# Patient Record
Sex: MALE | Race: WHITE
[De-identification: names, ages, dates, MRNs, and addresses within clinical notes are randomized per-mention and may not be internally consistent; named-entity substitution may affect disease eponyms.]

---

## 2018-12-18 NOTE — ED PHYSICIAN CHART
ED Chief Complaint/HPI





- Patient Information


Date Seen:: 12/18/18


Time Seen:: 15:20


Chief Complaint:: Weakness


History of Present Illness:: 





onset x 3 days of weakness, dizziness, syncope, S/P Fall, and vertigo; no 

report of H/As, visual or gait changes, neck pain, cough, C/P, SOB, Abd. Pain, A

/N/V/D/C, fever, chills, bleeding, or urinary s/s; pt's last tetanus shot: < 5 

years; UTD


Allergies:: 


 Allergies











Allergy/AdvReac Type Severity Reaction Status Date / Time


 


No Known Allergies Allergy   Verified 12/18/18 15:26











Historian:: Patient, Family Member


Review:: Nurse's Note Reviewed





<Liborio Parikh - Last Filed: 12/18/18 17:56>





- Patient Information


Allergies:: 


 Allergies











Allergy/AdvReac Type Severity Reaction Status Date / Time


 


No Known Allergies Allergy   Verified 12/18/18 15:26














<Elvis Rasmussen - Last Filed: 12/19/18 09:34>





ED Review of Systems





- Review of Systems


General/Constitutional: No fever, No chills, No weight loss, Weakness, No 

diaphoresis, No edema, No loss of appetite


Skin: No skin lesions, No rash, No bruising


Head: No headache, No light-headedness


Eyes: No loss of vision, No pain, No diplopia


ENT: No earache, No nasal drainage, No sore throat, No tinnitus


Neck: No neck pain, No swelling, No thyromegaly, No stiffness, No mass noted


Cardio Vascular: No chest pain, No palpitations, No PND, No orthopnea, No edema


Pulmonary: No SOB, No cough, No sputum, No wheezing


GI: No nausea, No vomiting, No diarrhea, No pain, No melena, No hematochezia, 

No constipation, No hematemesis


G/U: No dysuria, No frequency, No hematuria, No nacturia


Musculoskeletal: No bone or joint pain, No back pain, No muscle pain


Endocrine: No polyuria, No polydipsia


Psychiatric: No prior psych history, No depression, No anxiety, No suicidal 

ideation, No homicidal ideation, No auditory hallucination


Hematopoietic: No bruising, No lymphadenopathy


Allergic/Immuno: No urticaria, No angioedema


Neurological: No syncope, No focal symptoms, Weakness, No paresthesia, No 

headache, Seizure, Dizziness, Confusion, Vertigo





<Liborio Parikh - Last Filed: 12/18/18 17:56>





ED Past Medical History





- Past Medical History


Obtainable: Yes


Past Medical History: HTN, Seizures, Dementia


Family History: HTN


Social History: Non Smoker, No Alcohol, No Drug Use, Single, Care Facility


Surgical History: None


Psychiatricy History: Dementia


Medication: Reviewed





<Liborio Parikh - Last Filed: 12/18/18 17:56>





Family Medical History





- Family Member


  ** Mother


History Unknown: Yes





<Liborio Parikh - Last Filed: 12/18/18 17:56>





ED Physical Exam





- Physical Examination


General/Constitutional: Awake, Well-developed, well-nourished, Alert, No 

distress, GCS 15, Non-toxic appearing, Ambulatory


Head: Atraumatic


Eyes: Lids, conjuctiva normal, PERRL, EOMI


Skin: Nl inspection, No rash, No skin lesions, No ecchymosis, Well hydrated, No 

lymphadenopathy


ENMT: External ears, nose nl, TM canals nl, Nasal exam nl, Lips, teeth, gums nl

, Oropharynx nl, Tonsils nl


Neck: Nontender, Full ROM w/o pain, No JVD, No nuchal rigidity, No bruit, No 

mass, No stridor


Respiratory: Nl effort/Exclusion, Clear to Auscultation, No Wheeze/Rhonchi/Rales


Cardio Vascular: RRR, No murmur, gallop, rubs, NL S1 S2, Carotid/Femoral/Distal 

pulses equal bilaterally


GI: No tenderness/rebounding/guarding, No organomegaly, No hernia, Normal BS's, 

Nondistended, No mass/bruits, No McBurney tenderness, Rectum exam nl


: No CVA tenderness


Extremities: No tenderness or effusion, Full ROM, normal strength in all 

extremities, No edema, Normal digits & nails


Neuro/Psych: Alert/oriented, DTR's symmetric, Normal sensory exam, Normal motor 

strength, Judgement/insight normal, Mood normal, Normal gait, No focal deficits


Misc: Normal back, No paraspinal tenderness





<Liborio Parikh - Last Filed: 12/18/18 17:56>





ED Labs/Radiology/EKG Results





- Lab Results


Comments:: 





Reviewed





- EKG Interpretations


EKG Time:: 15:54


Rate & Rhythm: 53; SB


Comments:: 





old IWMI; non-specific st-t changes





<Liborio Parikh - Last Filed: 12/18/18 17:56>





- Lab Results


Results: 


 Laboratory Tests











  12/18/18 12/18/18 12/18/18





  15:40 15:40 15:40


 


WBC  5.2  


 


RBC  4.57  


 


Hgb  14.5  


 


Hct  43.1  


 


MCV  94.1  


 


MCH  31.8 H  


 


MCHC Differential  33.8  


 


RDW  12.9  


 


Plt Count  193  


 


MPV  10.4  


 


Add Manual Diff  YES  


 


Neutrophils %  43.1  


 


Band Neutrophils %  1  


 


Lymphocytes %  38.9  


 


Monocytes %  11.5 H  


 


Eosinophils %  5.5 H  


 


Basophils %  NP  


 


Neutrophils (Manual)  46  


 


Lymphocytes  38  


 


Monocytes  9  


 


Eosinophils  6 H  


 


PT   11.7 H 


 


INR   1.14 


 


Sodium    146 H


 


Potassium    3.8


 


Chloride    107


 


Carbon Dioxide    31.0


 


Anion Gap    11.8


 


BUN    22


 


Creatinine    1.0


 


Est GFR ( Amer)    > 60.0


 


Est GFR (Non-Af Amer)    > 60.0


 


BUN/Creatinine Ratio    22.0


 


Glucose    74


 


Calcium    9.9


 


Total Bilirubin    0.6


 


AST    19


 


ALT    40


 


Alkaline Phosphatase    55


 


Creatine Kinase    51


 


Troponin I   


 


B-Natriuretic Peptide   


 


Total Protein    7.3


 


Albumin    4.5


 


Globulin    2.8


 


Albumin/Globulin Ratio    1.6


 


Triglycerides    56


 


Cholesterol    171


 


LDL Cholesterol Direct    93


 


HDL Cholesterol    65


 


Amylase   


 


Lipase   


 


Urine Source   


 


Urine Color   


 


Urine Clarity   


 


Urine pH   


 


Ur Specific Gravity   


 


Urine Protein   


 


Urine Glucose (UA)   


 


Urine Ketones   


 


Urine Blood   


 


Urine Nitrate   


 


Urine Bilirubin   


 


Urine Urobilinogen   


 


Ur Leukocyte Esterase   


 


Urine RBC   


 


Urine WBC   


 


Ur Epithelial Cells   


 


Urine Bacteria   


 


Urine Opiates Screen   


 


Urine Methadone Screen   


 


Ur Barbiturates Screen   


 


Ur Tricyclics Screen   


 


Ur Phencyclidine Scrn   


 


Amphetamines Screen   


 


U Methamphetamines Scrn   


 


U Benzodiazepines Scrn   


 


U Cocaine Metab Screen   


 


U Cannabinoids Screen   














  12/18/18 12/18/18 12/18/18





  15:40 15:40 15:40


 


WBC   


 


RBC   


 


Hgb   


 


Hct   


 


MCV   


 


MCH   


 


MCHC Differential   


 


RDW   


 


Plt Count   


 


MPV   


 


Add Manual Diff   


 


Neutrophils %   


 


Band Neutrophils %   


 


Lymphocytes %   


 


Monocytes %   


 


Eosinophils %   


 


Basophils %   


 


Neutrophils (Manual)   


 


Lymphocytes   


 


Monocytes   


 


Eosinophils   


 


PT   


 


INR   


 


Sodium   


 


Potassium   


 


Chloride   


 


Carbon Dioxide   


 


Anion Gap   


 


BUN   


 


Creatinine   


 


Est GFR ( Amer)   


 


Est GFR (Non-Af Amer)   


 


BUN/Creatinine Ratio   


 


Glucose   


 


Calcium   


 


Total Bilirubin   


 


AST   


 


ALT   


 


Alkaline Phosphatase   


 


Creatine Kinase   


 


Troponin I   < 0.01 L 


 


B-Natriuretic Peptide  11.1  


 


Total Protein   


 


Albumin   


 


Globulin   


 


Albumin/Globulin Ratio   


 


Triglycerides   


 


Cholesterol   


 


LDL Cholesterol Direct   


 


HDL Cholesterol   


 


Amylase    41


 


Lipase    50


 


Urine Source   


 


Urine Color   


 


Urine Clarity   


 


Urine pH   


 


Ur Specific Gravity   


 


Urine Protein   


 


Urine Glucose (UA)   


 


Urine Ketones   


 


Urine Blood   


 


Urine Nitrate   


 


Urine Bilirubin   


 


Urine Urobilinogen   


 


Ur Leukocyte Esterase   


 


Urine RBC   


 


Urine WBC   


 


Ur Epithelial Cells   


 


Urine Bacteria   


 


Urine Opiates Screen   


 


Urine Methadone Screen   


 


Ur Barbiturates Screen   


 


Ur Tricyclics Screen   


 


Ur Phencyclidine Scrn   


 


Amphetamines Screen   


 


U Methamphetamines Scrn   


 


U Benzodiazepines Scrn   


 


U Cocaine Metab Screen   


 


U Cannabinoids Screen   














  12/18/18 12/18/18





  19:50 19:50


 


WBC  


 


RBC  


 


Hgb  


 


Hct  


 


MCV  


 


MCH  


 


MCHC Differential  


 


RDW  


 


Plt Count  


 


MPV  


 


Add Manual Diff  


 


Neutrophils %  


 


Band Neutrophils %  


 


Lymphocytes %  


 


Monocytes %  


 


Eosinophils %  


 


Basophils %  


 


Neutrophils (Manual)  


 


Lymphocytes  


 


Monocytes  


 


Eosinophils  


 


PT  


 


INR  


 


Sodium  


 


Potassium  


 


Chloride  


 


Carbon Dioxide  


 


Anion Gap  


 


BUN  


 


Creatinine  


 


Est GFR ( Amer)  


 


Est GFR (Non-Af Amer)  


 


BUN/Creatinine Ratio  


 


Glucose  


 


Calcium  


 


Total Bilirubin  


 


AST  


 


ALT  


 


Alkaline Phosphatase  


 


Creatine Kinase  


 


Troponin I  


 


B-Natriuretic Peptide  


 


Total Protein  


 


Albumin  


 


Globulin  


 


Albumin/Globulin Ratio  


 


Triglycerides  


 


Cholesterol  


 


LDL Cholesterol Direct  


 


HDL Cholesterol  


 


Amylase  


 


Lipase  


 


Urine Source  MIDSTREAM 


 


Urine Color  RED 


 


Urine Clarity  CLOUDY 


 


Urine pH  6.5 


 


Ur Specific Gravity  1.025 


 


Urine Protein  TRACE 


 


Urine Glucose (UA)  NEGATIVE 


 


Urine Ketones  NEGATIVE 


 


Urine Blood  LARGE H 


 


Urine Nitrate  NEGATIVE 


 


Urine Bilirubin  NEGATIVE 


 


Urine Urobilinogen  4.0 H 


 


Ur Leukocyte Esterase  TRACE H 


 


Urine RBC  2-5 H 


 


Urine WBC   H 


 


Ur Epithelial Cells  NONE SEEN 


 


Urine Bacteria  MODERATE H 


 


Urine Opiates Screen   NEGATIVE


 


Urine Methadone Screen   NEGATIVE


 


Ur Barbiturates Screen   NEGATIVE


 


Ur Tricyclics Screen   POSITIVE H


 


Ur Phencyclidine Scrn   NEGATIVE


 


Amphetamines Screen   NEGATIVE


 


U Methamphetamines Scrn   NEGATIVE


 


U Benzodiazepines Scrn   POSITIVE H


 


U Cocaine Metab Screen   NEGATIVE


 


U Cannabinoids Screen   NEGATIVE














<Elvis Rasmussen - Last Filed: 12/19/18 09:34>





ED Septic Shock





- .


Is Septic Shock (SBP<90, OR Lactate>4 mmol\L) present?: No





<Liborio Parikh - Last Filed: 12/18/18 17:56>





ED Reassessment (Disposition)





- Reassessment


Reassessment Condition:: Improved





- Diagnosis


Diagnosis:: 





Dx: Weakness; Dizziness; Vertigo; Syncope; S/P Fall; Bradycardia; Hypernatremia





<Liborio Parikh - Last Filed: 12/18/18 17:56>





- Reassessment


Reassessment:: 


Impression: 


- Urinary tract infection


- Psychosis





Plan:


- Rocephin 1g IM


- Admit to geropsych unit for further evaluation and management





Reassessment Condition:: Improved





- Patient Disposition


Discharge/Transfer:: [facility] w/in this hosp


Admitting Medical Physician:: Yisel Quintanilla


Admitting Psych Physician:: Blaze Kincaid





<Elvis Rasmussen - Last Filed: 12/19/18 09:34>

## 2018-12-19 NOTE — HISTORY & PHYSICAL
ADMIT DATE:     12/18/2018



CHIEF COMPLAINT:  Weakness.  The patient stated that he cannot keep himself

calm.



HISTORY OF PRESENT ILLNESS:  The patient is a 58-year-old male with a past

medical history of hypertension, seizures, dementia, brought in from nursing

facility for weakness, dizziness, and syncopal episode for the last 3 days.  The

patient also has reported vertigo.  No history of headache or visual change.  No

neck pain, no cough, or no shortness of breath.  No fever or no chills.  On

initial evaluation, the patient was afebrile and WBC count was 5200.  The

patient was admitted to Geropsych Unit for psychosis and UTI, Rocephin 1 gram IM

was given.  I was called and started on Bactrim.



PAST MEDICAL HISTORY:  Includes hypertension, seizures, and dementia.



FAMILY HISTORY:  Hypertension.



SOCIAL HISTORY:  Lives at a care facility.  The patient is single.  Denies

smoking, alcohol, or drug use.



PAST SURGICAL HISTORY:  None.



PSYCHIATRIC HISTORY:  Dementia.



MEDICATIONS:  As per medication reconciliation sheet.



ALLERGIES:  NKDA.



REVIEW OF SYSTEMS:

GENERAL:  The patient has no fever, no chills.

HEENT:  No diplopia, no photophobia, no sore throat.

RESPIRATORY:  No cough, no shortness of breath.

CARDIOVASCULAR:  No chest pain or palpitation.

GASTROINTESTINAL:  No nausea, no vomiting, no diarrhea, no constipation.

GENITOURINARY:  No dysuria.

NEUROLOGIC:  No headache, no dizziness, no focal weakness.

PSYCHIATRIC:  The patient is agitated at times.



PHYSICAL EXAMINATION:

VITAL SIGNS:  Shows temperature is 98.1 degrees Fahrenheit, pulse 63,

respirations 18, blood pressure 129/66.

GENERAL:  The patient is comfortable, sitting up in a chair.  Speech is clear. 

No aggressive behavior at this time.

HEENT:  Head is normocephalic, atraumatic.  Oral cavity moist.  Eyes, no pallor,

no icterus.  PERRLA, EOMI.

NECK:  Supple, no JVD, no carotid bruit.  Trachea in midline.

CHEST:  Bilateral breath sounds.  No crackles or wheezing.

HEART:  S1, S2 within normal limits.  Regular rhythm.  No murmur, no gallop.

ABDOMEN:  Soft, nontender, nondistended.  Bowel sounds present.

EXTREMITIES:  No cyanosis, no clubbing, no edema.

NEUROLOGICAL:  Alert, awake, oriented x 3.



LABORATORY DATA:  Current lab shows WBC count is 5200, hemoglobin 14.5,

hematocrit 43.1, platelets are 193,000, neutrophil is 43%.  Sodium is 146,

potassium 3.8, chloride 100, bicarbonate is 31, BUN is 22, creatinine 1, and

glucose is 74.  Urinalysis showed large blood, wbc's , trace

leukoesterase, and rbc's 2-5.  Toxicology screen was positive for tricyclics and

benzodiazepines.



IMPRESSION:

1.  Urinary tract infection.

2.  Hypertension, controlled.

3.  History of seizure disorder.

4.   Dementia.

5.  Confusion.

6.  Agitation.





PLAN:  We will continue same treatment.  Started Bactrim-DS for UTI.  Check CPK

and renal ultrasound.





DD: 12/19/2018 17:07

DT: 12/19/2018 19:39

JOB# [id number]  [id number]

[email or web address]

## 2018-12-20 NOTE — PSYCHIATRIC EVALUATION
DATE OF SERVICE:  12/18/2018



IDENTIFYING DATA:  The patient is a 58-year-old  male living with his

family.  Information obtained directly interviewing the patient as well as

reviewing the admission papers.  The patient has been admitted from [facility] in [place] accompanied by son.  The patient is reported to have been

not able to care for self and the patient has been admitted by the son.



HISTORY OF PRESENT ILLNESS:  This is the first psychiatric hospitalization to

[facility] for this patient who is selectively mute and is

mumbling to most of self.  As per the report the patient is reported to have

fallen down and hit his head on the day of prior to the hospitalization.  The

patient has a history of seizures and falls.  The patient is reported to have

lost 35 pounds of weight in the last 1 month and has been getting forgetful and

not able to care for self and the patient is in need of placement.  The patient

has been brought over here for stabilization.  The patient has not been able to

provide much of information.  Review of the chart indicated that the patient has

been on a higher dose of the Seroquel and the patient is too drowsy, it is

decided to decrease the dose on the medications.



PAST PSYCHIATRIC HISTORY:  Details are not known.



MEDICAL HISTORY:  Physical examination is requested to be done by Dr. Quintanilla.



PHYSICAL OR SEXUAL ABUSE HISTORY:  None.



LEGAL PROBLEMS:  None at this time.



STRENGTH AND ASSETS:  The patient has good family support.



MENTAL STATUS EXAMINATION:  The patient is a 58-year-old, looking his stated

age, thin built, superficially cooperative, selectively mute, not providing much

of information.  The patient, however, is noted to be mumbling.  Eye contact is

noted to be extremely poor.  Affect is noted to be very flat.  The patient is to

be depressed.  The patient is not able to care for self.  The patient is a total

care.  The patient's short and long-term memory could not be tested because the

patient is not able to answer any of the questions I am closing.  The patient is

alert and awake, but is not aware that he is in the hospital.



DIAGNOSTIC IMPRESSION:

AXIS I:  Dementia, Alzheimer's type and behavioral change, secondary trait.

AXIS II:  None.

AXIS III:  As per Dr. Quintanilla.



IMMEDIATE TREATMENT PLAN:  The patient is going to be observed on inpatient

unit, provided with supportive psychotherapy.  The patient is going to be

closely monitored and encouraged to verbalize the concerns.  Once stabilized,

the patient is going to be discharged to the skilled nursing facility for

further care.





DD: 12/19/2018 17:54

DT: 12/20/2018 02:53

[facility]# [id number]  [id number]

## 2018-12-20 NOTE — INFECTIOUS DISEASE PROG NOTE
Infectious Disease Subjective





- Review of Systems


Service Date: 12/20/18


Subjective: 





There is no new change, there is no fever.





Infectious Disease Objective





- Results


Result Diagrams: 


 12/18/18 15:40





 12/18/18 15:40


Recent Labs: 


 Laboratory Last Values











WBC  5.2 Th/cmm (4.8-10.8)   12/18/18  15:40    


 


RBC  4.57 Mil/cmm (4.30-5.70)   12/18/18  15:40    


 


Hgb  14.5 gm/dL (12-16)   12/18/18  15:40    


 


Hct  43.1 % (41.0-60)   12/18/18  15:40    


 


MCV  94.1 fl (80-99)   12/18/18  15:40    


 


MCH  31.8 pg (26.0-30.0)  H  12/18/18  15:40    


 


MCHC Differential  33.8 pg (28.0-36.0)   12/18/18  15:40    


 


RDW  12.9 % (11.5-20.0)   12/18/18  15:40    


 


Plt Count  193 Th/cmm (150-400)   12/18/18  15:40    


 


MPV  10.4 fl  12/18/18  15:40    


 


Add Manual Diff  YES   12/18/18  15:40    


 


Neutrophils %  43.1 % (40.0-80.0)   12/18/18  15:40    


 


Band Neutrophils %  1 % (0-10)   12/18/18  15:40    


 


Lymphocytes %  38.9 % (20.0-50.0)   12/18/18  15:40    


 


Monocytes %  11.5 % (2.0-10.0)  H  12/18/18  15:40    


 


Eosinophils %  5.5 % (0.0-5.0)  H  12/18/18  15:40    


 


Basophils %  NP   12/18/18  15:40    


 


Neutrophils (Manual)  46 % (40-80)   12/18/18  15:40    


 


Lymphocytes  38 % (20-50)   12/18/18  15:40    


 


Monocytes  9 % (2-10)   12/18/18  15:40    


 


Eosinophils  6 % (0-5)  H  12/18/18  15:40    


 


PT  11.7 SECONDS (9.5-11.5)  H  12/18/18  15:40    


 


INR  1.14  (0.5-1.4)   12/18/18  15:40    


 


Sodium  146 mEq/L (136-145)  H  12/18/18  15:40    


 


Potassium  3.8 mEq/L (3.5-5.1)   12/18/18  15:40    


 


Chloride  107 mEq/L ()   12/18/18  15:40    


 


Carbon Dioxide  31.0 mEq/L (21.0-31.0)   12/18/18  15:40    


 


Anion Gap  11.8  (7.0-16.0)   12/18/18  15:40    


 


BUN  22 mg/dL (7-25)   12/18/18  15:40    


 


Creatinine  1.0 mg/dL (0.7-1.3)   12/18/18  15:40    


 


Est GFR ( Amer)  > 60.0 ml/min (>90)   12/18/18  15:40    


 


Est GFR (Non-Af Amer)  > 60.0 ml/min  12/18/18  15:40    


 


BUN/Creatinine Ratio  22.0   12/18/18  15:40    


 


Glucose  74 mg/dL ()   12/18/18  15:40    


 


POC Glucose  99 MG/DL (70 - 105)   12/18/18  22:17    


 


Calcium  9.9 mg/dL (8.6-10.3)   12/18/18  15:40    


 


Total Bilirubin  0.6 mg/dL (0.3-1.0)   12/18/18  15:40    


 


AST  19 U/L (13-39)   12/18/18  15:40    


 


ALT  40 U/L (7-52)   12/18/18  15:40    


 


Alkaline Phosphatase  55 U/L ()   12/18/18  15:40    


 


Creatine Kinase  130 U/L ()   12/20/18  06:30    


 


Troponin I  < 0.01 ng/mL (0.01-0.05)  L  12/18/18  15:40    


 


B-Natriuretic Peptide  11.1 pg/mL (5.0-100.0)   12/18/18  15:40    


 


Total Protein  7.3 gm/dL (6.0-8.3)   12/18/18  15:40    


 


Albumin  4.5 gm/dL (4.2-5.5)   12/18/18  15:40    


 


Globulin  2.8 gm/dL  12/18/18  15:40    


 


Albumin/Globulin Ratio  1.6  (1.0-1.8)   12/18/18  15:40    


 


Triglycerides  56 mg/dL (<150)   12/18/18  15:40    


 


Cholesterol  171 mg/dL (<200)   12/18/18  15:40    


 


LDL Cholesterol Direct  93 mg/dL ()   12/18/18  15:40    


 


HDL Cholesterol  65 mg/dL (23-92)   12/18/18  15:40    


 


Amylase  41 U/L ()   12/18/18  15:40    


 


Lipase  50 U/L (11-82)   12/18/18  15:40    


 


TSH  1.52 uIU/ml (0.34-5.60)   12/20/18  06:30    


 


Urine Source  MIDSTREAM   12/18/18  19:50    


 


Urine Color  RED   12/18/18  19:50    


 


Urine Clarity  CLOUDY  (CLEAR)   12/18/18  19:50    


 


Urine pH  6.5  (4.6 - 8.0)   12/18/18  19:50    


 


Ur Specific Gravity  1.025  (1.005-1.030)   12/18/18  19:50    


 


Urine Protein  TRACE mg/dL (NEGATIVE)   12/18/18  19:50    


 


Urine Glucose (UA)  NEGATIVE mg/dL (NEGATIVE)   12/18/18  19:50    


 


Urine Ketones  NEGATIVE mg/dL (NEGATIVE)   12/18/18  19:50    


 


Urine Blood  LARGE  (NEGATIVE)  H  12/18/18  19:50    


 


Urine Nitrate  NEGATIVE  (NEGATIVE)   12/18/18  19:50    


 


Urine Bilirubin  NEGATIVE  (NEGATIVE)   12/18/18  19:50    


 


Urine Urobilinogen  4.0 E.U./dL (0.2 - 1.0)  H  12/18/18  19:50    


 


Ur Leukocyte Esterase  TRACE  (NEGATIVE)  H  12/18/18  19:50    


 


Urine RBC  2-5 /hpf (0-5)  H  12/18/18  19:50    


 


Urine WBC   /hpf (0-5)  H  12/18/18  19:50    


 


Ur Epithelial Cells  NONE SEEN /lpf (FEW)   12/18/18  19:50    


 


Urine Bacteria  MODERATE /hpf (NONE SEEN)  H  12/18/18  19:50    


 


Urine Opiates Screen  NEGATIVE  (NEGATIVE)   12/18/18  19:50    


 


Urine Methadone Screen  NEGATIVE  (NEGATIVE)   12/18/18  19:50    


 


Ur Barbiturates Screen  NEGATIVE  (NEGATIVE)   12/18/18  19:50    


 


Ur Tricyclics Screen  POSITIVE  (NEGATIVE)  H  12/18/18  19:50    


 


Ur Phencyclidine Scrn  NEGATIVE  (NEGATIVE)   12/18/18  19:50    


 


Amphetamines Screen  NEGATIVE  (NEGATIVE)   12/18/18  19:50    


 


U Methamphetamines Scrn  NEGATIVE  (NEGATIVE)   12/18/18  19:50    


 


U Benzodiazepines Scrn  POSITIVE  (NEGATIVE)  H  12/18/18  19:50    


 


U Cocaine Metab Screen  NEGATIVE  (NEGATIVE)   12/18/18  19:50    


 


U Cannabinoids Screen  NEGATIVE  (NEGATIVE)   12/18/18  19:50    














- Physical Exam


Vitals and I&O: 


 Vital Signs











Temp  97.9 F   12/20/18 07:07


 


Pulse  61   12/20/18 07:07


 


Resp  18   12/20/18 07:07


 


BP  112/61   12/20/18 07:07


 


Pulse Ox  98   12/20/18 07:07








 Intake & Output











 12/19/18 12/20/18 12/20/18





 18:59 06:59 18:59


 


Intake Total  100 120


 


Balance  100 120


 


Intake:   


 


  Oral  100 120


 


Other:   


 


  # Voids 3 1 3


 


  # Bowel Movements 0  











Active Medications: 


Current Medications





Acetaminophen (Tylenol)  650 mg PO Q4H PRN


   PRN Reason: Pain or Fever >101


   Stop: 02/16/19 23:19


Al Hydrox/Mg Hydrox/Simethicone (Maalox)  30 ml PO Q4HR PRN


   PRN Reason: GI DISTRESS


   Stop: 02/16/19 22:34


Citalopram Hydrobromide (Celexa)  40 mg PO DAILY [facility]; Protocol


   Stop: 02/17/19 08:59


   Last Admin: 12/19/18 08:59 Dose:  40 mg


Donepezil HCl (Aricept)  10 mg PO HS [facility]


   Stop: 02/17/19 20:59


   Last Admin: 12/19/18 21:14 Dose:  10 mg


Guaifenesin/Dextromethorphan (Robitussin Dm)  10 ml PO Q6H PRN


   PRN Reason: Cough


   Stop: 02/16/19 23:19


Levetiracetam (Keppra)  1,000 mg PO BID [facility]


   Stop: 02/17/19 08:59


   Last Admin: 12/20/18 10:25 Dose:  1,000 mg


Lorazepam (Ativan)  0.5 mg PO Q4HR PRN; Protocol


   PRN Reason: Anxiety


   Stop: 01/17/19 22:34


   Last Admin: 12/20/18 13:48 Dose:  0.5 mg


Lorazepam (Ativan)  2 mg PO HS [facility]; Protocol


   Stop: 02/17/19 20:59


   Last Admin: 12/19/18 21:14 Dose:  2 mg


Magnesium Hydroxide (Milk Of Magnesia)  30 ml PO HS PRN


   PRN Reason: Constipation


Memantine (Namenda)  10 mg PO BID [facility]


   Stop: 02/17/19 08:59


   Last Admin: 12/20/18 13:47 Dose:  Not Given


Multivitamins/Vitamin C (Theragran)  1 tab PO DAILY [facility]


   Stop: 02/17/19 08:59


   Last Admin: 12/20/18 13:47 Dose:  Not Given


Quetiapine Fumarate (Seroquel)  25 mg PO BID [facility]; Protocol


   Stop: 02/17/19 16:59


Trazodone HCl (Desyrel)  50 mg PO HS [facility]; Protocol


   Stop: 02/17/19 20:59


   Last Admin: 12/19/18 21:15 Dose:  50 mg


Trimethoprim/Sulfamethoxazole (Bactrim Ds)  1 tab PO BID [facility]


   Stop: 02/17/19 08:59


   Last Admin: 12/20/18 10:25 Dose:  1 tab


Zolpidem Tartrate (Ambien)  5 mg PO HS PRN


   PRN Reason: Insomnia


   Stop: 02/16/19 22:34








General: no acute distress, well developed, well nourished


HEENT: atraumatic, normocephalic, PERRLA, EOMI


Neck: supple, no thyromegaly, no lymphadenopathy


Cardiovascular: S1S2, regular


Lungs: clear to auscultation bilaterally, clear to percussion


Abdomen: soft, no tender, no distended


Extremities: no cyanosis, no clubbing, no edema


Neurological: awake, alert, oriented


Skin: intact





Infectious Disease Assmt/Plan





- Assessment


Assessment: 





1.  Urinary tract infection.


2.  Hypertension, controlled.


3.  History of seizure disorder.


4.  Dementia.


5.  Confusion.


6.  Agitation.


 





- Plan


Plan: 





CPM.


Check renal us.

## 2018-12-20 NOTE — CONSULTATION
DATE OF CONSULTATION:     12/20/2018



REFERRING PHYSICIAN:  Blaze Kincaid MD



TYPE OF CONSULTATION:  Psychology.



HISTORY OF PRESENT ILLNESS:  The patient is a 58-year-old  male.  The

following is by record review and by the patient's self report.  According to

record review, the patient is living with his family and was admitted from

[facility] by his son.  Record review indicates the patient

is unable to care for himself.  The report also states that the patient had

fallen down and hit his head on the day prior to hospitalization.  The patient

has a history of seizures and falls.  The record indicates the patient had lost

35 pounds in 1 month's time and has become increasingly forgetful.  Upon

interview, the patient is mumbling and is not providing much information.  The

patient shook his head "no" to experiencing suicidal ideation, plan or 
intention.



PAST MEDICAL HISTORY:  Please see history and physical by Dr. Quintanilla.



PAST PSYCHIATRIC HISTORY:  Records are unavailable.  Details are unknown.



SUBSTANCE ABUSE HISTORY:  The patient did not answer these questions.  There is

no information in the current record.



PSYCHOSOCIAL HISTORY:  The patient did not answer questions about occupational

or educational history or [employer] affiliation.  The patient indicated there is

no history of physical or sexual abuse or any current legal problems.  The

patient's family is very involved in his care and are seeking possible placement

due to grave disability and/or failure to thrive.



MENTAL STATUS EXAMINATION:  The patient appears to be his stated age.  The

patient's attitude is cooperative.  The patient presents as a thinly built.  Eye

contact is poor.  Speech is selectively mute and at times entirely nonverbal. 

The patient is not providing much information.  Mood is depressed.  Affect is

flat.  Thought process shows to be depressogenic and impoverished.  The patient

did not answer questions about experiencing auditory or visual hallucinations or

delusions.  The patient's behavior is severely withdrawn as well as anergic and

anhedonic and amotivated.  Impulse control is poor.  Concentration is impaired. 

Records indicate the patient is unable to care for self.  The patient did not

participate in the memory assessment.  It appears that immediate and short term

memory are impaired.  Long-term memory needs evaluation.  Sensorium is alert,

but oriented to self only.  The patient did not participate in the

interpretation of proverbs.  Insight is impaired.  Judgment is impaired.



DIAGNOSTIC IMPRESSION:

AXIS I:

1.  Dementia, Alzheimer's type early onset with behavioral disturbance.

2.  Mood disorder, depressed due to medical condition.

AXIS II:  Deferred.

AXIS III:  Per Dr. Quintanilla.



TREATMENT PLAN:  The patient has been seen by Dr. Kincaid for psychiatric

evaluation and for the management of the patient's psychotropic medications.  We

will provide supportive psychotherapy to include reality orientation and

integration.  We will provide cognitive behavioral therapy to reduce the

patient's depression.  We will provide coping skills and strategies for phase of

life issues.  We will provide motivational enhancement for the patient to become

compliant and stay compliant with all aspects of his care and treatment and to

be able to commit to treatment goals.  We will provide skills for the patient to

be able to demonstrate the ability and capacity to care for himself.  We will

closely monitor appetite and sleep.  The patient's family is very involved in

his care.  Family education will be provided if the opportunity presents itself.

The patient at this time is almost nonverbal.  This writer will reevaluate and

reassess the patient for capacity to benefit from psychology services on the

next visit.



Thank you, Dr. Kincaid for this consult and the opportunity to participate in

this patient's care.





DD: 12/20/2018 12:09

DT: 12/20/2018 23:41

JOB# [id number]  [id number]

ALBARO

## 2018-12-20 NOTE — INFECTIOUS DISEASE PROG NOTE
Infectious Disease Subjective





- Review of Systems


Service Date: 12/20/18


Subjective: 





There is no new change, there is no fever.





Infectious Disease Objective





- Results


Result Diagrams: 


 12/18/18 15:40





 12/18/18 15:40


Recent Labs: 


 Laboratory Last Values











WBC  5.2 Th/cmm (4.8-10.8)   12/18/18  15:40    


 


RBC  4.57 Mil/cmm (4.30-5.70)   12/18/18  15:40    


 


Hgb  14.5 gm/dL (12-16)   12/18/18  15:40    


 


Hct  43.1 % (41.0-60)   12/18/18  15:40    


 


MCV  94.1 fl (80-99)   12/18/18  15:40    


 


MCH  31.8 pg (26.0-30.0)  H  12/18/18  15:40    


 


MCHC Differential  33.8 pg (28.0-36.0)   12/18/18  15:40    


 


RDW  12.9 % (11.5-20.0)   12/18/18  15:40    


 


Plt Count  193 Th/cmm (150-400)   12/18/18  15:40    


 


MPV  10.4 fl  12/18/18  15:40    


 


Add Manual Diff  YES   12/18/18  15:40    


 


Neutrophils %  43.1 % (40.0-80.0)   12/18/18  15:40    


 


Band Neutrophils %  1 % (0-10)   12/18/18  15:40    


 


Lymphocytes %  38.9 % (20.0-50.0)   12/18/18  15:40    


 


Monocytes %  11.5 % (2.0-10.0)  H  12/18/18  15:40    


 


Eosinophils %  5.5 % (0.0-5.0)  H  12/18/18  15:40    


 


Basophils %  NP   12/18/18  15:40    


 


Neutrophils (Manual)  46 % (40-80)   12/18/18  15:40    


 


Lymphocytes  38 % (20-50)   12/18/18  15:40    


 


Monocytes  9 % (2-10)   12/18/18  15:40    


 


Eosinophils  6 % (0-5)  H  12/18/18  15:40    


 


PT  11.7 SECONDS (9.5-11.5)  H  12/18/18  15:40    


 


INR  1.14  (0.5-1.4)   12/18/18  15:40    


 


Sodium  146 mEq/L (136-145)  H  12/18/18  15:40    


 


Potassium  3.8 mEq/L (3.5-5.1)   12/18/18  15:40    


 


Chloride  107 mEq/L ()   12/18/18  15:40    


 


Carbon Dioxide  31.0 mEq/L (21.0-31.0)   12/18/18  15:40    


 


Anion Gap  11.8  (7.0-16.0)   12/18/18  15:40    


 


BUN  22 mg/dL (7-25)   12/18/18  15:40    


 


Creatinine  1.0 mg/dL (0.7-1.3)   12/18/18  15:40    


 


Est GFR ( Amer)  > 60.0 ml/min (>90)   12/18/18  15:40    


 


Est GFR (Non-Af Amer)  > 60.0 ml/min  12/18/18  15:40    


 


BUN/Creatinine Ratio  22.0   12/18/18  15:40    


 


Glucose  74 mg/dL ()   12/18/18  15:40    


 


POC Glucose  99 MG/DL (70 - 105)   12/18/18  22:17    


 


Calcium  9.9 mg/dL (8.6-10.3)   12/18/18  15:40    


 


Total Bilirubin  0.6 mg/dL (0.3-1.0)   12/18/18  15:40    


 


AST  19 U/L (13-39)   12/18/18  15:40    


 


ALT  40 U/L (7-52)   12/18/18  15:40    


 


Alkaline Phosphatase  55 U/L ()   12/18/18  15:40    


 


Creatine Kinase  130 U/L ()   12/20/18  06:30    


 


Troponin I  < 0.01 ng/mL (0.01-0.05)  L  12/18/18  15:40    


 


B-Natriuretic Peptide  11.1 pg/mL (5.0-100.0)   12/18/18  15:40    


 


Total Protein  7.3 gm/dL (6.0-8.3)   12/18/18  15:40    


 


Albumin  4.5 gm/dL (4.2-5.5)   12/18/18  15:40    


 


Globulin  2.8 gm/dL  12/18/18  15:40    


 


Albumin/Globulin Ratio  1.6  (1.0-1.8)   12/18/18  15:40    


 


Triglycerides  56 mg/dL (<150)   12/18/18  15:40    


 


Cholesterol  171 mg/dL (<200)   12/18/18  15:40    


 


LDL Cholesterol Direct  93 mg/dL ()   12/18/18  15:40    


 


HDL Cholesterol  65 mg/dL (23-92)   12/18/18  15:40    


 


Amylase  41 U/L ()   12/18/18  15:40    


 


Lipase  50 U/L (11-82)   12/18/18  15:40    


 


TSH  1.52 uIU/ml (0.34-5.60)   12/20/18  06:30    


 


Urine Source  MIDSTREAM   12/18/18  19:50    


 


Urine Color  RED   12/18/18  19:50    


 


Urine Clarity  CLOUDY  (CLEAR)   12/18/18  19:50    


 


Urine pH  6.5  (4.6 - 8.0)   12/18/18  19:50    


 


Ur Specific Gravity  1.025  (1.005-1.030)   12/18/18  19:50    


 


Urine Protein  TRACE mg/dL (NEGATIVE)   12/18/18  19:50    


 


Urine Glucose (UA)  NEGATIVE mg/dL (NEGATIVE)   12/18/18  19:50    


 


Urine Ketones  NEGATIVE mg/dL (NEGATIVE)   12/18/18  19:50    


 


Urine Blood  LARGE  (NEGATIVE)  H  12/18/18  19:50    


 


Urine Nitrate  NEGATIVE  (NEGATIVE)   12/18/18  19:50    


 


Urine Bilirubin  NEGATIVE  (NEGATIVE)   12/18/18  19:50    


 


Urine Urobilinogen  4.0 E.U./dL (0.2 - 1.0)  H  12/18/18  19:50    


 


Ur Leukocyte Esterase  TRACE  (NEGATIVE)  H  12/18/18  19:50    


 


Urine RBC  2-5 /hpf (0-5)  H  12/18/18  19:50    


 


Urine WBC   /hpf (0-5)  H  12/18/18  19:50    


 


Ur Epithelial Cells  NONE SEEN /lpf (FEW)   12/18/18  19:50    


 


Urine Bacteria  MODERATE /hpf (NONE SEEN)  H  12/18/18  19:50    


 


Urine Opiates Screen  NEGATIVE  (NEGATIVE)   12/18/18  19:50    


 


Urine Methadone Screen  NEGATIVE  (NEGATIVE)   12/18/18  19:50    


 


Ur Barbiturates Screen  NEGATIVE  (NEGATIVE)   12/18/18  19:50    


 


Ur Tricyclics Screen  POSITIVE  (NEGATIVE)  H  12/18/18  19:50    


 


Ur Phencyclidine Scrn  NEGATIVE  (NEGATIVE)   12/18/18  19:50    


 


Amphetamines Screen  NEGATIVE  (NEGATIVE)   12/18/18  19:50    


 


U Methamphetamines Scrn  NEGATIVE  (NEGATIVE)   12/18/18  19:50    


 


U Benzodiazepines Scrn  POSITIVE  (NEGATIVE)  H  12/18/18  19:50    


 


U Cocaine Metab Screen  NEGATIVE  (NEGATIVE)   12/18/18  19:50    


 


U Cannabinoids Screen  NEGATIVE  (NEGATIVE)   12/18/18  19:50    














- Physical Exam


Vitals and I&O: 


 Vital Signs











Temp  97.9 F   12/20/18 07:07


 


Pulse  61   12/20/18 07:07


 


Resp  18   12/20/18 07:07


 


BP  112/61   12/20/18 07:07


 


Pulse Ox  98   12/20/18 07:07








 Intake & Output











 12/19/18 12/20/18 12/20/18





 18:59 06:59 18:59


 


Intake Total  100 120


 


Balance  100 120


 


Intake:   


 


  Oral  100 120


 


Other:   


 


  # Voids 3 1 3


 


  # Bowel Movements 0  











Active Medications: 


Current Medications





Acetaminophen (Tylenol)  650 mg PO Q4H PRN


   PRN Reason: Pain or Fever >101


   Stop: 02/16/19 23:19


Al Hydrox/Mg Hydrox/Simethicone (Maalox)  30 ml PO Q4HR PRN


   PRN Reason: GI DISTRESS


   Stop: 02/16/19 22:34


Citalopram Hydrobromide (Celexa)  40 mg PO DAILY [facility]; Protocol


   Stop: 02/17/19 08:59


   Last Admin: 12/19/18 08:59 Dose:  40 mg


Donepezil HCl (Aricept)  10 mg PO HS [facility]


   Stop: 02/17/19 20:59


   Last Admin: 12/19/18 21:14 Dose:  10 mg


Guaifenesin/Dextromethorphan (Robitussin Dm)  10 ml PO Q6H PRN


   PRN Reason: Cough


   Stop: 02/16/19 23:19


Levetiracetam (Keppra)  1,000 mg PO BID [facility]


   Stop: 02/17/19 08:59


   Last Admin: 12/20/18 10:25 Dose:  1,000 mg


Lorazepam (Ativan)  0.5 mg PO Q4HR PRN; Protocol


   PRN Reason: Anxiety


   Stop: 01/17/19 22:34


   Last Admin: 12/20/18 13:48 Dose:  0.5 mg


Lorazepam (Ativan)  2 mg PO HS [facility]; Protocol


   Stop: 02/17/19 20:59


   Last Admin: 12/19/18 21:14 Dose:  2 mg


Magnesium Hydroxide (Milk Of Magnesia)  30 ml PO HS PRN


   PRN Reason: Constipation


Memantine (Namenda)  10 mg PO BID [facility]


   Stop: 02/17/19 08:59


   Last Admin: 12/20/18 13:47 Dose:  Not Given


Multivitamins/Vitamin C (Theragran)  1 tab PO DAILY [facility]


   Stop: 02/17/19 08:59


   Last Admin: 12/20/18 13:47 Dose:  Not Given


Quetiapine Fumarate (Seroquel)  25 mg PO BID [facility]; Protocol


   Stop: 02/17/19 16:59


Trazodone HCl (Desyrel)  50 mg PO HS [facility]; Protocol


   Stop: 02/17/19 20:59


   Last Admin: 12/19/18 21:15 Dose:  50 mg


Trimethoprim/Sulfamethoxazole (Bactrim Ds)  1 tab PO BID [facility]


   Stop: 02/17/19 08:59


   Last Admin: 12/20/18 10:25 Dose:  1 tab


Zolpidem Tartrate (Ambien)  5 mg PO HS PRN


   PRN Reason: Insomnia


   Stop: 02/16/19 22:34








General: no acute distress, well developed, well nourished


HEENT: atraumatic, normocephalic, PERRLA, EOMI


Neck: supple, no thyromegaly


Cardiovascular: S1S2, regular


Lungs: clear to auscultation bilaterally, clear to percussion


Abdomen: soft, no tender, no distended, no mass


Extremities: no cyanosis, no clubbing, no edema


Neurological: awake, alert, oriented


Skin: intact





Infectious Disease Assmt/Plan





- Assessment


Assessment: 





1.  Urinary tract infection.


2.  Hypertension, controlled.


3.  History of seizure disorder.


4.  Dementia.


5.  Confusion.


6.  Agitation.


 





- Plan


Plan: 





CPM.

## 2018-12-25 NOTE — INTERNAL MEDICINE PROG NOTE
Internal Medicine Objective





- Results


Result Diagrams: 


 18 15:40





 18 15:40


Recent Labs: 


 Laboratory Last Values











WBC  5.2 Th/cmm (4.8-10.8)   18  15:40    


 


RBC  4.57 Mil/cmm (4.30-5.70)   18  15:40    


 


Hgb  14.5 gm/dL (12-16)   18  15:40    


 


Hct  43.1 % (41.0-60)   18  15:40    


 


MCV  94.1 fl (80-99)   18  15:40    


 


MCH  31.8 pg (26.0-30.0)  H  18  15:40    


 


MCHC Differential  33.8 pg (28.0-36.0)   18  15:40    


 


RDW  12.9 % (11.5-20.0)   18  15:40    


 


Plt Count  193 Th/cmm (150-400)   18  15:40    


 


MPV  10.4 fl  18  15:40    


 


Add Manual Diff  YES   18  15:40    


 


Neutrophils %  43.1 % (40.0-80.0)   18  15:40    


 


Band Neutrophils %  1 % (0-10)   18  15:40    


 


Lymphocytes %  38.9 % (20.0-50.0)   18  15:40    


 


Monocytes %  11.5 % (2.0-10.0)  H  18  15:40    


 


Eosinophils %  5.5 % (0.0-5.0)  H  18  15:40    


 


Basophils %  NP   18  15:40    


 


Neutrophils (Manual)  46 % (40-80)   18  15:40    


 


Lymphocytes  38 % (20-50)   18  15:40    


 


Monocytes  9 % (2-10)   18  15:40    


 


Eosinophils  6 % (0-5)  H  18  15:40    


 


PT  11.7 SECONDS (9.5-11.5)  H  18  15:40    


 


INR  1.14  (0.5-1.4)   18  15:40    


 


Sodium  146 mEq/L (136-145)  H  18  15:40    


 


Potassium  3.8 mEq/L (3.5-5.1)   18  15:40    


 


Chloride  107 mEq/L ()   18  15:40    


 


Carbon Dioxide  31.0 mEq/L (21.0-31.0)   18  15:40    


 


Anion Gap  11.8  (7.0-16.0)   18  15:40    


 


BUN  22 mg/dL (7-25)   18  15:40    


 


Creatinine  1.0 mg/dL (0.7-1.3)   18  15:40    


 


Est GFR ( Amer)  > 60.0 ml/min (>90)   18  15:40    


 


Est GFR (Non-Af Amer)  > 60.0 ml/min  18  15:40    


 


BUN/Creatinine Ratio  22.0   18  15:40    


 


Glucose  74 mg/dL ()   18  15:40    


 


POC Glucose  99 MG/DL (70 - 105)   18  22:17    


 


Calcium  9.9 mg/dL (8.6-10.3)   18  15:40    


 


Total Bilirubin  0.6 mg/dL (0.3-1.0)   18  15:40    


 


AST  19 U/L (13-39)   18  15:40    


 


ALT  40 U/L (7-52)   18  15:40    


 


Alkaline Phosphatase  55 U/L ()   18  15:40    


 


Creatine Kinase  130 U/L ()   18  06:30    


 


Troponin I  < 0.01 ng/mL (0.01-0.05)  L  18  15:40    


 


B-Natriuretic Peptide  11.1 pg/mL (5.0-100.0)   18  15:40    


 


Total Protein  7.3 gm/dL (6.0-8.3)   18  15:40    


 


Albumin  4.5 gm/dL (4.2-5.5)   18  15:40    


 


Globulin  2.8 gm/dL  18  15:40    


 


Albumin/Globulin Ratio  1.6  (1.0-1.8)   18  15:40    


 


Triglycerides  56 mg/dL (<150)   18  15:40    


 


Cholesterol  171 mg/dL (<200)   18  15:40    


 


LDL Cholesterol Direct  93 mg/dL ()   18  15:40    


 


HDL Cholesterol  65 mg/dL (23-92)   18  15:40    


 


Amylase  41 U/L ()   18  15:40    


 


Lipase  50 U/L (11-82)   18  15:40    


 


Vitamin B12  594 pg/mL (232-1245)   18  06:30    


 


TSH  1.52 uIU/ml (0.34-5.60)   18  06:30    


 


Urine Source  MIDSTREAM   18  19:50    


 


Urine Color  RED   18  19:50    


 


Urine Clarity  CLOUDY  (CLEAR)   18  19:50    


 


Urine pH  6.5  (4.6 - 8.0)   18  19:50    


 


Ur Specific Gravity  1.025  (1.005-1.030)   18  19:50    


 


Urine Protein  TRACE mg/dL (NEGATIVE)   18  19:50    


 


Urine Glucose (UA)  NEGATIVE mg/dL (NEGATIVE)   18  19:50    


 


Urine Ketones  NEGATIVE mg/dL (NEGATIVE)   18  19:50    


 


Urine Blood  LARGE  (NEGATIVE)  H  18  19:50    


 


Urine Nitrate  NEGATIVE  (NEGATIVE)   18  19:50    


 


Urine Bilirubin  NEGATIVE  (NEGATIVE)   18  19:50    


 


Urine Urobilinogen  4.0 E.U./dL (0.2 - 1.0)  H  18  19:50    


 


Ur Leukocyte Esterase  TRACE  (NEGATIVE)  H  18  19:50    


 


Urine RBC  2-5 /hpf (0-5)  H  18  19:50    


 


Urine WBC   /hpf (0-5)  H  18  19:50    


 


Ur Epithelial Cells  NONE SEEN /lpf (FEW)   18  19:50    


 


Urine Bacteria  MODERATE /hpf (NONE SEEN)  H  18  19:50    


 


Urine Opiates Screen  NEGATIVE  (NEGATIVE)   18  19:50    


 


Urine Methadone Screen  NEGATIVE  (NEGATIVE)   18  19:50    


 


Ur Barbiturates Screen  NEGATIVE  (NEGATIVE)   18  19:50    


 


Ur Tricyclics Screen  POSITIVE  (NEGATIVE)  H  18  19:50    


 


Ur Phencyclidine Scrn  NEGATIVE  (NEGATIVE)   18  19:50    


 


Amphetamines Screen  NEGATIVE  (NEGATIVE)   18  19:50    


 


U Methamphetamines Scrn  NEGATIVE  (NEGATIVE)   18  19:50    


 


U Benzodiazepines Scrn  POSITIVE  (NEGATIVE)  H  18  19:50    


 


U Cocaine Metab Screen  NEGATIVE  (NEGATIVE)   18  19:50    


 


U Cannabinoids Screen  NEGATIVE  (NEGATIVE)   18  19:50    


 


RPR  NONREACTIVE  (NONREACTIVE)   18  06:30    














- Physical Exam


Vitals and I&O: 


 Vital Signs











Temp  97.9 F   18 14:00


 


Pulse  68   18 14:00


 


Resp  16   18 14:00


 


BP  116/67   18 14:00


 


Pulse Ox  96   18 14:00








 Intake & Output











 18





 18:59 06:59 18:59


 


Intake Total 1200 240 


 


Balance 1200 240 


 


Intake:   


 


  Oral 1200 240 


 


Other:   


 


  # Voids 3 2 


 


  # Bowel Movements 0  











Active Medications: 


Current Medications





Acetaminophen (Tylenol)  650 mg PO Q4H PRN


   PRN Reason: Pain or Fever >101


   Stop: 19 23:19


Al Hydrox/Mg Hydrox/Simethicone (Maalox)  30 ml PO Q4HR PRN


   PRN Reason: GI DISTRESS


   Stop: 19 22:34


Citalopram Hydrobromide (Celexa)  20 mg PO DAILY [facility]; Protocol


   Stop: 19 08:59


   Last Admin: 18 09:54 Dose:  20 mg


Donepezil HCl (Aricept)  10 mg PO HS [facility]


   Stop: 19 20:59


   Last Admin: 18 20:48 Dose:  10 mg


Guaifenesin/Dextromethorphan (Robitussin Dm)  10 ml PO Q6H PRN


   PRN Reason: Cough


   Stop: 19 23:19


Levetiracetam (Keppra)  1,000 mg PO BID [facility]


   Stop: 19 08:59


   Last Admin: 18 16:58 Dose:  1,000 mg


Lorazepam (Ativan)  0.5 mg PO Q4HR PRN; Protocol


   PRN Reason: Anxiety


   Stop: 19 22:34


   Last Admin: 18 15:54 Dose:  0.5 mg


Lorazepam (Ativan)  2 mg PO HS [facility]; Protocol


   Stop: 19 20:59


   Last Admin: 18 20:48 Dose:  2 mg


Magnesium Hydroxide (Milk Of Magnesia)  30 ml PO HS PRN


   PRN Reason: Constipation


Memantine (Namenda)  10 mg PO BID [facility]


   Stop: 19 08:59


   Last Admin: 18 16:58 Dose:  10 mg


Multivitamins/Vitamin C (Theragran)  1 tab PO DAILY [facility]


   Stop: 19 08:59


   Last Admin: 18 09:54 Dose:  1 tab


Quetiapine Fumarate (Seroquel)  25 mg PO BID [facility]; Protocol


   Stop: 19 16:59


   Last Admin: 18 16:58 Dose:  25 mg


Trazodone HCl (Desyrel)  50 mg PO HS [facility]; Protocol


   Stop: 19 20:59


   Last Admin: 18 20:48 Dose:  50 mg


Trimethoprim/Sulfamethoxazole (Bactrim Ds)  1 tab PO BID [facility]


   Stop: 19 08:59


   Last Admin: 18 16:58 Dose:  1 tab


Zolpidem Tartrate (Ambien)  5 mg PO HS PRN


   PRN Reason: Insomnia


   Stop: 19 22:34











Nutritional Asmnt/Malnutr-PDOC





- Dietary Evaluation


Malnutrition Findings (Please click <Entered> for more info): 








Nutritional Asmnt/Malnutrition                             Start:  18 13:

10


Text:                                                      Status: Complete    

  


Freq:                                                                          

  


Protocol:                                                                      

  


 Document     18 13:10  [email or web address]  (Rec: 18 13:21  [email or web address]  [facility])


 Nutritional Asmnt/Malnutrition


     Patient General Information


      Nutritional Screening                      Moderate Risk


      Diagnosis                                  psychosis


      Pertinent Medical Hx/Surgical Hx           HTN, seizures, dementia


      Subjective Information                     Pt was in gerichair in the


                                                 dining room, confused, at time


                                                 of visit. Pt is a feeder and


                                                 eats well per RN. PO intake is


                                                 % per EMR.


      Current Diet Order/ Nutrition Support      regular


      Patient / S.O                              Can't verbalize diet edu


      Pertinent Medications                      theragran, seroquel, ambien


      Pertinent Labs                              Na 146


     Nutritional Hx/Data


      Height                                     1.75 m


      Height (Calculated Centimeters)            175.3


      Current Weight (lbs)                       56.699 kg


      Weight (Calculated Kilograms)              56.7


      Weight (Calculated Grams)                  14553.0


      Ideal Body Weight                          160


      Body Mass Index (BMI)                      18.4


      Weight Status                              Underweight


     GI Symptoms


      GI Symptoms                                None


      Last BM                                    


      Difficult in:                              None


      Food Allergies                             No


      Skin Integrity/Comment:                    intact, area of concern,


                                                 anderson 18


      Current %PO                                Good (%)


     Estimated Nutritional Goals


      BEE in Kcals:                              Using Current wt


      Calories/Kcals/Kg                          27-32


      Kcals Calculated                           9396-0273


      Protein:                                   Using Current wt


      Protein g/k


      Protein Calculated                         56


      Fluid: ml                                  6121-1273 (1ml/kcal)


     Nutritional Problem


      No current Nutrition Prob


       Problem                                   N/A


     Malnutrition Alert


      Is there a minimum of two criteria         No


       selected?                                 


       Query Text:Check all the applicable       


       criteria. A minimum of two criteria are   


       recommended for diagnosis of either       


       severe or non-severe malnutrition.        


     Malnutrition Related to Morbid Obesity


      Malnutrition related to morbid obesity     No


     Intervention/Recommendation


      Comments                                   1. Continue with regular diet


                                                 as ordered


                                                 2. Monitor PO intake, wt, labs


                                                 and skin integrity


                                                 3. F/U as low risk in 7 days,


                                                 , PO check 


     Expected Outcomes/Goals


      Expected Outcomes/Goals                    Goal: PO intake to meet at


                                                 least 75% of nutritional needs


                                                 .


                                                 Reviewed by Joanne Nugent RD

## 2018-12-26 NOTE — GENERAL PROGRESS NOTE
Subjective





- Review of Systems


Events since last encounter: 





pt. still agitated,


no acute distress





Objective





- Results


Result Diagrams: 


 18 15:40





 18 15:40


Recent Labs: 


 Laboratory Last Values











WBC  5.2 Th/cmm (4.8-10.8)   18  15:40    


 


RBC  4.57 Mil/cmm (4.30-5.70)   18  15:40    


 


Hgb  14.5 gm/dL (12-16)   18  15:40    


 


Hct  43.1 % (41.0-60)   18  15:40    


 


MCV  94.1 fl (80-99)   18  15:40    


 


MCH  31.8 pg (26.0-30.0)  H  18  15:40    


 


MCHC Differential  33.8 pg (28.0-36.0)   18  15:40    


 


RDW  12.9 % (11.5-20.0)   18  15:40    


 


Plt Count  193 Th/cmm (150-400)   18  15:40    


 


MPV  10.4 fl  18  15:40    


 


Add Manual Diff  YES   18  15:40    


 


Neutrophils %  43.1 % (40.0-80.0)   18  15:40    


 


Band Neutrophils %  1 % (0-10)   18  15:40    


 


Lymphocytes %  38.9 % (20.0-50.0)   18  15:40    


 


Monocytes %  11.5 % (2.0-10.0)  H  18  15:40    


 


Eosinophils %  5.5 % (0.0-5.0)  H  18  15:40    


 


Basophils %  NP   18  15:40    


 


Neutrophils (Manual)  46 % (40-80)   18  15:40    


 


Lymphocytes  38 % (20-50)   18  15:40    


 


Monocytes  9 % (2-10)   18  15:40    


 


Eosinophils  6 % (0-5)  H  18  15:40    


 


PT  11.7 SECONDS (9.5-11.5)  H  18  15:40    


 


INR  1.14  (0.5-1.4)   18  15:40    


 


Sodium  146 mEq/L (136-145)  H  18  15:40    


 


Potassium  3.8 mEq/L (3.5-5.1)   18  15:40    


 


Chloride  107 mEq/L ()   18  15:40    


 


Carbon Dioxide  31.0 mEq/L (21.0-31.0)   18  15:40    


 


Anion Gap  11.8  (7.0-16.0)   18  15:40    


 


BUN  22 mg/dL (7-25)   18  15:40    


 


Creatinine  1.0 mg/dL (0.7-1.3)   18  15:40    


 


Est GFR ( Amer)  > 60.0 ml/min (>90)   18  15:40    


 


Est GFR (Non-Af Amer)  > 60.0 ml/min  18  15:40    


 


BUN/Creatinine Ratio  22.0   18  15:40    


 


Glucose  74 mg/dL ()   18  15:40    


 


POC Glucose  99 MG/DL (70 - 105)   18  22:17    


 


Calcium  9.9 mg/dL (8.6-10.3)   18  15:40    


 


Total Bilirubin  0.6 mg/dL (0.3-1.0)   18  15:40    


 


AST  19 U/L (13-39)   18  15:40    


 


ALT  40 U/L (7-52)   18  15:40    


 


Alkaline Phosphatase  55 U/L ()   18  15:40    


 


Creatine Kinase  130 U/L ()   18  06:30    


 


Troponin I  < 0.01 ng/mL (0.01-0.05)  L  18  15:40    


 


B-Natriuretic Peptide  11.1 pg/mL (5.0-100.0)   18  15:40    


 


Total Protein  7.3 gm/dL (6.0-8.3)   18  15:40    


 


Albumin  4.5 gm/dL (4.2-5.5)   18  15:40    


 


Globulin  2.8 gm/dL  18  15:40    


 


Albumin/Globulin Ratio  1.6  (1.0-1.8)   18  15:40    


 


Triglycerides  56 mg/dL (<150)   18  15:40    


 


Cholesterol  171 mg/dL (<200)   18  15:40    


 


LDL Cholesterol Direct  93 mg/dL ()   18  15:40    


 


HDL Cholesterol  65 mg/dL (23-92)   18  15:40    


 


Amylase  41 U/L ()   18  15:40    


 


Lipase  50 U/L (11-82)   18  15:40    


 


Vitamin B12  594 pg/mL (232-1245)   18  06:30    


 


TSH  1.52 uIU/ml (0.34-5.60)   18  06:30    


 


Urine Source  MIDSTREAM   18  19:50    


 


Urine Color  RED   18  19:50    


 


Urine Clarity  CLOUDY  (CLEAR)   18  19:50    


 


Urine pH  6.5  (4.6 - 8.0)   18  19:50    


 


Ur Specific Gravity  1.025  (1.005-1.030)   18  19:50    


 


Urine Protein  TRACE mg/dL (NEGATIVE)   18  19:50    


 


Urine Glucose (UA)  NEGATIVE mg/dL (NEGATIVE)   18  19:50    


 


Urine Ketones  NEGATIVE mg/dL (NEGATIVE)   18  19:50    


 


Urine Blood  LARGE  (NEGATIVE)  H  18  19:50    


 


Urine Nitrate  NEGATIVE  (NEGATIVE)   18  19:50    


 


Urine Bilirubin  NEGATIVE  (NEGATIVE)   18  19:50    


 


Urine Urobilinogen  4.0 E.U./dL (0.2 - 1.0)  H  18  19:50    


 


Ur Leukocyte Esterase  TRACE  (NEGATIVE)  H  18  19:50    


 


Urine RBC  2-5 /hpf (0-5)  H  18  19:50    


 


Urine WBC   /hpf (0-5)  H  18  19:50    


 


Ur Epithelial Cells  NONE SEEN /lpf (FEW)   18  19:50    


 


Urine Bacteria  MODERATE /hpf (NONE SEEN)  H  18  19:50    


 


Urine Opiates Screen  NEGATIVE  (NEGATIVE)   18  19:50    


 


Urine Methadone Screen  NEGATIVE  (NEGATIVE)   18  19:50    


 


Ur Barbiturates Screen  NEGATIVE  (NEGATIVE)   18  19:50    


 


Ur Tricyclics Screen  POSITIVE  (NEGATIVE)  H  18  19:50    


 


Ur Phencyclidine Scrn  NEGATIVE  (NEGATIVE)   18  19:50    


 


Amphetamines Screen  NEGATIVE  (NEGATIVE)   18  19:50    


 


U Methamphetamines Scrn  NEGATIVE  (NEGATIVE)   18  19:50    


 


U Benzodiazepines Scrn  POSITIVE  (NEGATIVE)  H  18  19:50    


 


U Cocaine Metab Screen  NEGATIVE  (NEGATIVE)   18  19:50    


 


U Cannabinoids Screen  NEGATIVE  (NEGATIVE)   18  19:50    


 


RPR  NONREACTIVE  (NONREACTIVE)   18  06:30    














- Physical Exam


Vitals and I&O: 


 Vital Signs











Temp  98.2 F   18 06:27


 


Pulse  62   18 06:27


 


Resp  19   18 06:27


 


BP  123/74   18 06:27


 


Pulse Ox  97   18 06:27








 Intake & Output











 18





 18:59 06:59 18:59


 


Intake Total 1400 60 


 


Balance 1400 60 


 


Intake:   


 


  Oral 1400 60 


 


Other:   


 


  # Voids 2 2 


 


  # Bowel Movements 0 0 











Active Medications: 


Current Medications





Acetaminophen (Tylenol)  650 mg PO Q4H PRN


   PRN Reason: Pain or Fever >101


   Stop: 19 23:19


Al Hydrox/Mg Hydrox/Simethicone (Maalox)  30 ml PO Q4HR PRN


   PRN Reason: GI DISTRESS


   Stop: 19 22:34


Citalopram Hydrobromide (Celexa)  20 mg PO DAILY [facility]; Protocol


   Stop: 19 08:59


   Last Admin: 18 09:11 Dose:  20 mg


Donepezil HCl (Aricept)  10 mg PO HS [facility]


   Stop: 19 20:59


   Last Admin: 18 20:24 Dose:  10 mg


Guaifenesin/Dextromethorphan (Robitussin Dm)  10 ml PO Q6H PRN


   PRN Reason: Cough


   Stop: 19 23:19


Levetiracetam (Keppra)  1,000 mg PO BID [facility]


   Stop: 19 08:59


   Last Admin: 18 09:11 Dose:  1,000 mg


Lorazepam (Ativan)  0.5 mg PO Q4HR PRN; Protocol


   PRN Reason: Anxiety


   Stop: 19 22:34


   Last Admin: 18 15:54 Dose:  0.5 mg


Magnesium Hydroxide (Milk Of Magnesia)  30 ml PO HS PRN


   PRN Reason: Constipation


Memantine (Namenda)  10 mg PO BID [facility]


   Stop: 19 08:59


   Last Admin: 18 09:11 Dose:  10 mg


Multivitamins/Vitamin C (Theragran)  1 tab PO DAILY [facility]


   Stop: 19 08:59


   Last Admin: 18 09:11 Dose:  1 tab


Quetiapine Fumarate (Seroquel)  25 mg PO BID [facility]; Protocol


   Stop: 19 16:59


   Last Admin: 18 09:11 Dose:  25 mg


Trimethoprim/Sulfamethoxazole (Bactrim Ds)  1 tab PO BID [facility]


   Stop: 19 08:59


   Last Admin: 18 09:11 Dose:  1 tab


Zolpidem Tartrate (Ambien)  5 mg PO HS PRN


   PRN Reason: Insomnia


   Stop: 19 22:34








General: Alert, No acute distress


HEENT: Atraumatic


Neck: Supple


Cardiovascular: Regular rate


Lungs: Clear to auscultation


Abdomen: Bowel sounds


Extremities: Pulses


Neurological: Normal gait





Assessment/Plan





- Assessment


Assessment: 





UTI


HTN


h/o seizure disorder


dementia


confusion


agitation





- Plan


Plan: 





cpm





Nutritional Asmnt/Malnutr-PDOC





- Dietary Evaluation


Malnutrition Findings (Please click <Entered> for more info): 








Nutritional Asmnt/Malnutrition                             Start:  18 13:

10


Text:                                                      Status: Complete    

  


Freq:                                                                          

  


Protocol:                                                                      

  


 Document     18 13:10  JLI1  (Rec: 18 13:21  JLI1  [facility])


 Nutritional Asmnt/Malnutrition


     Patient General Information


      Nutritional Screening                      Moderate Risk


      Diagnosis                                  psychosis


      Pertinent Medical Hx/Surgical Hx           HTN, seizures, dementia


      Subjective Information                     Pt was in gerichair in the


                                                 dining room, confused, at time


                                                 of visit. Pt is a feeder and


                                                 eats well per RN. PO intake is


                                                 % per EMR.


      Current Diet Order/ Nutrition Support      regular


      Patient / S.O                              Can't verbalize diet edu


      Pertinent Medications                      theragran, seroquel, ambien


      Pertinent Labs                              Na 146


     Nutritional Hx/Data


      Height                                     1.75 m


      Height (Calculated Centimeters)            175.3


      Current Weight (lbs)                       56.699 kg


      Weight (Calculated Kilograms)              56.7


      Weight (Calculated Grams)                  19770.0


      Ideal Body Weight                          160


      Body Mass Index (BMI)                      18.4


      Weight Status                              Underweight


     GI Symptoms


      GI Symptoms                                None


      Last BM                                    


      Difficult in:                              None


      Food Allergies                             No


      Skin Integrity/Comment:                    intact, area of concern,


                                                 anderson 18


      Current %PO                                Good (%)


     Estimated Nutritional Goals


      BEE in Kcals:                              Using Current wt


      Calories/Kcals/Kg                          27-32


      Kcals Calculated                           9016-9168


      Protein:                                   Using Current wt


      Protein g/k


      Protein Calculated                         56


      Fluid: ml                                  4141-9698 (1ml/kcal)


     Nutritional Problem


      No current Nutrition Prob


       Problem                                   N/A


     Malnutrition Alert


      Is there a minimum of two criteria         No


       selected?                                 


       Query Text:Check all the applicable       


       criteria. A minimum of two criteria are   


       recommended for diagnosis of either       


       severe or non-severe malnutrition.        


     Malnutrition Related to Morbid Obesity


      Malnutrition related to morbid obesity     No


     Intervention/Recommendation


      Comments                                   1. Continue with regular diet


                                                 as ordered


                                                 2. Monitor PO intake, wt, labs


                                                 and skin integrity


                                                 3. F/U as low risk in 7 days,


                                                 , PO check 


     Expected Outcomes/Goals


      Expected Outcomes/Goals                    Goal: PO intake to meet at


                                                 least 75% of nutritional needs


                                                 .


                                                 Reviewed by Joanne Nugent RD

## 2018-12-28 NOTE — GENERAL PROGRESS NOTE
Subjective





- Review of Systems


Events since last encounter: 





pt. is irritable,


no acute distress 





Objective





- Results


Result Diagrams: 


 18 15:40





 18 15:40


Recent Labs: 


 Laboratory Last Values











WBC  5.2 Th/cmm (4.8-10.8)   18  15:40    


 


RBC  4.57 Mil/cmm (4.30-5.70)   18  15:40    


 


Hgb  14.5 gm/dL (12-16)   18  15:40    


 


Hct  43.1 % (41.0-60)   18  15:40    


 


MCV  94.1 fl (80-99)   18  15:40    


 


MCH  31.8 pg (26.0-30.0)  H  18  15:40    


 


MCHC Differential  33.8 pg (28.0-36.0)   18  15:40    


 


RDW  12.9 % (11.5-20.0)   18  15:40    


 


Plt Count  193 Th/cmm (150-400)   18  15:40    


 


MPV  10.4 fl  18  15:40    


 


Add Manual Diff  YES   18  15:40    


 


Neutrophils %  43.1 % (40.0-80.0)   18  15:40    


 


Band Neutrophils %  1 % (0-10)   18  15:40    


 


Lymphocytes %  38.9 % (20.0-50.0)   18  15:40    


 


Monocytes %  11.5 % (2.0-10.0)  H  18  15:40    


 


Eosinophils %  5.5 % (0.0-5.0)  H  18  15:40    


 


Basophils %  NP   18  15:40    


 


Neutrophils (Manual)  46 % (40-80)   18  15:40    


 


Lymphocytes  38 % (20-50)   18  15:40    


 


Monocytes  9 % (2-10)   18  15:40    


 


Eosinophils  6 % (0-5)  H  18  15:40    


 


PT  11.7 SECONDS (9.5-11.5)  H  18  15:40    


 


INR  1.14  (0.5-1.4)   18  15:40    


 


Sodium  146 mEq/L (136-145)  H  18  15:40    


 


Potassium  3.8 mEq/L (3.5-5.1)   18  15:40    


 


Chloride  107 mEq/L ()   18  15:40    


 


Carbon Dioxide  31.0 mEq/L (21.0-31.0)   18  15:40    


 


Anion Gap  11.8  (7.0-16.0)   18  15:40    


 


BUN  22 mg/dL (7-25)   18  15:40    


 


Creatinine  1.0 mg/dL (0.7-1.3)   18  15:40    


 


Est GFR ( Amer)  > 60.0 ml/min (>90)   18  15:40    


 


Est GFR (Non-Af Amer)  > 60.0 ml/min  18  15:40    


 


BUN/Creatinine Ratio  22.0   18  15:40    


 


Glucose  74 mg/dL ()   18  15:40    


 


POC Glucose  99 MG/DL (70 - 105)   18  22:17    


 


Calcium  9.9 mg/dL (8.6-10.3)   18  15:40    


 


Total Bilirubin  0.6 mg/dL (0.3-1.0)   18  15:40    


 


AST  19 U/L (13-39)   18  15:40    


 


ALT  40 U/L (7-52)   18  15:40    


 


Alkaline Phosphatase  55 U/L ()   18  15:40    


 


Creatine Kinase  130 U/L ()   18  06:30    


 


Troponin I  < 0.01 ng/mL (0.01-0.05)  L  18  15:40    


 


B-Natriuretic Peptide  11.1 pg/mL (5.0-100.0)   18  15:40    


 


Total Protein  7.3 gm/dL (6.0-8.3)   18  15:40    


 


Albumin  4.5 gm/dL (4.2-5.5)   18  15:40    


 


Globulin  2.8 gm/dL  18  15:40    


 


Albumin/Globulin Ratio  1.6  (1.0-1.8)   18  15:40    


 


Triglycerides  56 mg/dL (<150)   18  15:40    


 


Cholesterol  171 mg/dL (<200)   18  15:40    


 


LDL Cholesterol Direct  93 mg/dL ()   18  15:40    


 


HDL Cholesterol  65 mg/dL (23-92)   18  15:40    


 


Amylase  41 U/L ()   18  15:40    


 


Lipase  50 U/L (11-82)   18  15:40    


 


Vitamin B12  594 pg/mL (232-1245)   18  06:30    


 


TSH  1.52 uIU/ml (0.34-5.60)   18  06:30    


 


Urine Source  MIDSTREAM   18  19:50    


 


Urine Color  RED   18  19:50    


 


Urine Clarity  CLOUDY  (CLEAR)   18  19:50    


 


Urine pH  6.5  (4.6 - 8.0)   18  19:50    


 


Ur Specific Gravity  1.025  (1.005-1.030)   18  19:50    


 


Urine Protein  TRACE mg/dL (NEGATIVE)   18  19:50    


 


Urine Glucose (UA)  NEGATIVE mg/dL (NEGATIVE)   18  19:50    


 


Urine Ketones  NEGATIVE mg/dL (NEGATIVE)   18  19:50    


 


Urine Blood  LARGE  (NEGATIVE)  H  18  19:50    


 


Urine Nitrate  NEGATIVE  (NEGATIVE)   18  19:50    


 


Urine Bilirubin  NEGATIVE  (NEGATIVE)   18  19:50    


 


Urine Urobilinogen  4.0 E.U./dL (0.2 - 1.0)  H  18  19:50    


 


Ur Leukocyte Esterase  TRACE  (NEGATIVE)  H  18  19:50    


 


Urine RBC  2-5 /hpf (0-5)  H  18  19:50    


 


Urine WBC   /hpf (0-5)  H  18  19:50    


 


Ur Epithelial Cells  NONE SEEN /lpf (FEW)   18  19:50    


 


Urine Bacteria  MODERATE /hpf (NONE SEEN)  H  18  19:50    


 


Urine Opiates Screen  NEGATIVE  (NEGATIVE)   18  19:50    


 


Urine Methadone Screen  NEGATIVE  (NEGATIVE)   18  19:50    


 


Ur Barbiturates Screen  NEGATIVE  (NEGATIVE)   18  19:50    


 


Ur Tricyclics Screen  POSITIVE  (NEGATIVE)  H  18  19:50    


 


Ur Phencyclidine Scrn  NEGATIVE  (NEGATIVE)   18  19:50    


 


Amphetamines Screen  NEGATIVE  (NEGATIVE)   18  19:50    


 


U Methamphetamines Scrn  NEGATIVE  (NEGATIVE)   18  19:50    


 


U Benzodiazepines Scrn  POSITIVE  (NEGATIVE)  H  18  19:50    


 


U Cocaine Metab Screen  NEGATIVE  (NEGATIVE)   18  19:50    


 


U Cannabinoids Screen  NEGATIVE  (NEGATIVE)   18  19:50    


 


RPR  NONREACTIVE  (NONREACTIVE)   18  06:30    














- Physical Exam


Vitals and I&O: 


 Vital Signs











Temp  98.2 F   18 06:09


 


Pulse  64   18 06:09


 


Resp  20   18 06:09


 


BP  131/78   18 06:09


 


Pulse Ox  98   18 06:09








 Intake & Output











 18





 18:59 06:59 18:59


 


Intake Total 1200 120 


 


Output Total  3 


 


Balance 1200 117 


 


Weight (lbs)  56.699 kg 


 


Intake:   


 


  Oral 1200 120 


 


Output:   


 


  Stool  3 


 


  Urine/Stool Mix  0 


 


Other:   


 


  # Voids  3 


 


  # Bowel Movements 2 0 


 


  Weight Source  Bedscale 











Active Medications: 


Current Medications





Acetaminophen (Tylenol)  650 mg PO Q4H PRN


   PRN Reason: Pain or Fever >101


   Stop: 19 23:19


Al Hydrox/Mg Hydrox/Simethicone (Maalox)  30 ml PO Q4HR PRN


   PRN Reason: GI DISTRESS


   Stop: 19 22:34


Citalopram Hydrobromide (Celexa)  20 mg PO DAILY JESSICA; Protocol


   Stop: 19 08:59


   Last Admin: 18 10:08 Dose:  20 mg


Donepezil HCl (Aricept)  10 mg PO HS JESSICA


   Stop: 19 20:59


   Last Admin: 18 20:57 Dose:  10 mg


Guaifenesin/Dextromethorphan (Robitussin Dm)  10 ml PO Q6H PRN


   PRN Reason: Cough


   Stop: 19 23:19


Levetiracetam (Keppra)  1,000 mg PO BID [facility]


   Stop: 19 08:59


   Last Admin: 18 10:08 Dose:  1,000 mg


Lorazepam (Ativan)  0.5 mg PO Q4HR PRN; Protocol


   PRN Reason: Anxiety


   Stop: 19 22:34


   Last Admin: 18 20:34 Dose:  0.5 mg


Magnesium Hydroxide (Milk Of Magnesia)  30 ml PO HS PRN


   PRN Reason: Constipation


Memantine (Namenda)  10 mg PO BID JESSICA


   Stop: 19 08:59


   Last Admin: 18 10:08 Dose:  10 mg


Multivitamins/Vitamin C (Theragran)  1 tab PO DAILY JESSICA


   Stop: 19 08:59


   Last Admin: 18 10:08 Dose:  1 tab


Quetiapine Fumarate (Seroquel)  25 mg PO BID [facility]; Protocol


   Stop: 19 16:59


   Last Admin: 18 10:07 Dose:  25 mg


Zolpidem Tartrate (Ambien)  5 mg PO HS PRN


   PRN Reason: Insomnia


   Stop: 19 22:34


   Last Admin: 18 20:34 Dose:  5 mg








General: Alert, No acute distress


HEENT: Atraumatic


Neck: Supple


Cardiovascular: Regular rate


Lungs: Clear to auscultation


Abdomen: Bowel sounds


Extremities: Pulses


Neurological: Normal gait





Assessment/Plan





- Assessment


Assessment: 





UTI


HTN


h/o seizure disorder


dementia


confusion


agitation





- Plan


Plan: 





cpm





Nutritional Asmnt/Malnutr-PDOC





- Dietary Evaluation


Malnutrition Findings (Please click <Entered> for more info): 








Nutritional Asmnt/Malnutrition                             Start:  18 13:

10


Text:                                                      Status: Complete    

  


Freq:                                                                          

  


Protocol:                                                                      

  


 Document     18 13:10  [email or web address]  (Rec: 18 13:21  [email or web address]  CABRERA)


 Nutritional Asmnt/Malnutrition


     Patient General Information


      Nutritional Screening                      Moderate Risk


      Diagnosis                                  psychosis


      Pertinent Medical Hx/Surgical Hx           HTN, seizures, dementia


      Subjective Information                     Pt was in gerichair in the


                                                 dining room, confused, at time


                                                 of visit. Pt is a feeder and


                                                 eats well per RN. PO intake is


                                                 % per EMR.


      Current Diet Order/ Nutrition Support      regular


      Patient / S.O                              Can't verbalize diet edu


      Pertinent Medications                      theragran, seroquel, ambien


      Pertinent Labs                              Na 146


     Nutritional Hx/Data


      Height                                     1.75 m


      Height (Calculated Centimeters)            175.3


      Current Weight (lbs)                       56.699 kg


      Weight (Calculated Kilograms)              56.7


      Weight (Calculated Grams)                  47183.0


      Ideal Body Weight                          160


      Body Mass Index (BMI)                      18.4


      Weight Status                              Underweight


     GI Symptoms


      GI Symptoms                                None


      Last BM                                    


      Difficult in:                              None


      Food Allergies                             No


      Skin Integrity/Comment:                    intact, area of concern,


                                                 anderson 18


      Current %PO                                Good (%)


     Estimated Nutritional Goals


      BEE in Kcals:                              Using Current wt


      Calories/Kcals/Kg                          27-32


      Kcals Calculated                           9069-3053


      Protein:                                   Using Current wt


      Protein g/k


      Protein Calculated                         56


      Fluid: ml                                  3992-2422 (1ml/kcal)


     Nutritional Problem


      No current Nutrition Prob


       Problem                                   N/A


     Malnutrition Alert


      Is there a minimum of two criteria         No


       selected?                                 


       Query Text:Check all the applicable       


       criteria. A minimum of two criteria are   


       recommended for diagnosis of either       


       severe or non-severe malnutrition.        


     Malnutrition Related to Morbid Obesity


      Malnutrition related to morbid obesity     No


     Intervention/Recommendation


      Comments                                   1. Continue with regular diet


                                                 as ordered


                                                 2. Monitor PO intake, wt, labs


                                                 and skin integrity


                                                 3. F/U as low risk in 7 days,


                                                 , PO check 


     Expected Outcomes/Goals


      Expected Outcomes/Goals                    Goal: PO intake to meet at


                                                 least 75% of nutritional needs


                                                 .


                                                 Reviewed by Joanne Nugent RD

## 2018-12-29 NOTE — GENERAL PROGRESS NOTE
Objective





- Results


Result Diagrams: 


 18 15:40





 18 15:40


Recent Labs: 


 Laboratory Last Values











WBC  5.2 Th/cmm (4.8-10.8)   18  15:40    


 


RBC  4.57 Mil/cmm (4.30-5.70)   18  15:40    


 


Hgb  14.5 gm/dL (12-16)   18  15:40    


 


Hct  43.1 % (41.0-60)   18  15:40    


 


MCV  94.1 fl (80-99)   18  15:40    


 


MCH  31.8 pg (26.0-30.0)  H  18  15:40    


 


MCHC Differential  33.8 pg (28.0-36.0)   18  15:40    


 


RDW  12.9 % (11.5-20.0)   18  15:40    


 


Plt Count  193 Th/cmm (150-400)   18  15:40    


 


MPV  10.4 fl  18  15:40    


 


Add Manual Diff  YES   18  15:40    


 


Neutrophils %  43.1 % (40.0-80.0)   18  15:40    


 


Band Neutrophils %  1 % (0-10)   18  15:40    


 


Lymphocytes %  38.9 % (20.0-50.0)   18  15:40    


 


Monocytes %  11.5 % (2.0-10.0)  H  18  15:40    


 


Eosinophils %  5.5 % (0.0-5.0)  H  18  15:40    


 


Basophils %  NP   18  15:40    


 


Neutrophils (Manual)  46 % (40-80)   18  15:40    


 


Lymphocytes  38 % (20-50)   18  15:40    


 


Monocytes  9 % (2-10)   18  15:40    


 


Eosinophils  6 % (0-5)  H  18  15:40    


 


PT  11.7 SECONDS (9.5-11.5)  H  18  15:40    


 


INR  1.14  (0.5-1.4)   18  15:40    


 


Sodium  146 mEq/L (136-145)  H  18  15:40    


 


Potassium  3.8 mEq/L (3.5-5.1)   18  15:40    


 


Chloride  107 mEq/L ()   18  15:40    


 


Carbon Dioxide  31.0 mEq/L (21.0-31.0)   18  15:40    


 


Anion Gap  11.8  (7.0-16.0)   18  15:40    


 


BUN  22 mg/dL (7-25)   18  15:40    


 


Creatinine  1.0 mg/dL (0.7-1.3)   18  15:40    


 


Est GFR ( Amer)  > 60.0 ml/min (>90)   18  15:40    


 


Est GFR (Non-Af Amer)  > 60.0 ml/min  18  15:40    


 


BUN/Creatinine Ratio  22.0   18  15:40    


 


Glucose  74 mg/dL ()   18  15:40    


 


POC Glucose  99 MG/DL (70 - 105)   18  22:17    


 


Calcium  9.9 mg/dL (8.6-10.3)   18  15:40    


 


Total Bilirubin  0.6 mg/dL (0.3-1.0)   18  15:40    


 


AST  19 U/L (13-39)   18  15:40    


 


ALT  40 U/L (7-52)   18  15:40    


 


Alkaline Phosphatase  55 U/L ()   18  15:40    


 


Creatine Kinase  130 U/L ()   18  06:30    


 


Troponin I  < 0.01 ng/mL (0.01-0.05)  L  18  15:40    


 


B-Natriuretic Peptide  11.1 pg/mL (5.0-100.0)   18  15:40    


 


Total Protein  7.3 gm/dL (6.0-8.3)   18  15:40    


 


Albumin  4.5 gm/dL (4.2-5.5)   18  15:40    


 


Globulin  2.8 gm/dL  18  15:40    


 


Albumin/Globulin Ratio  1.6  (1.0-1.8)   18  15:40    


 


Triglycerides  56 mg/dL (<150)   18  15:40    


 


Cholesterol  171 mg/dL (<200)   18  15:40    


 


LDL Cholesterol Direct  93 mg/dL ()   18  15:40    


 


HDL Cholesterol  65 mg/dL (23-92)   18  15:40    


 


Amylase  41 U/L ()   18  15:40    


 


Lipase  50 U/L (11-82)   18  15:40    


 


Vitamin B12  594 pg/mL (232-1245)   18  06:30    


 


TSH  1.52 uIU/ml (0.34-5.60)   18  06:30    


 


Urine Source  MIDSTREAM   18  19:50    


 


Urine Color  RED   18  19:50    


 


Urine Clarity  CLOUDY  (CLEAR)   18  19:50    


 


Urine pH  6.5  (4.6 - 8.0)   18  19:50    


 


Ur Specific Gravity  1.025  (1.005-1.030)   18  19:50    


 


Urine Protein  TRACE mg/dL (NEGATIVE)   18  19:50    


 


Urine Glucose (UA)  NEGATIVE mg/dL (NEGATIVE)   18  19:50    


 


Urine Ketones  NEGATIVE mg/dL (NEGATIVE)   18  19:50    


 


Urine Blood  LARGE  (NEGATIVE)  H  18  19:50    


 


Urine Nitrate  NEGATIVE  (NEGATIVE)   18  19:50    


 


Urine Bilirubin  NEGATIVE  (NEGATIVE)   18  19:50    


 


Urine Urobilinogen  4.0 E.U./dL (0.2 - 1.0)  H  18  19:50    


 


Ur Leukocyte Esterase  TRACE  (NEGATIVE)  H  18  19:50    


 


Urine RBC  2-5 /hpf (0-5)  H  18  19:50    


 


Urine WBC   /hpf (0-5)  H  18  19:50    


 


Ur Epithelial Cells  NONE SEEN /lpf (FEW)   18  19:50    


 


Urine Bacteria  MODERATE /hpf (NONE SEEN)  H  18  19:50    


 


Urine Opiates Screen  NEGATIVE  (NEGATIVE)   18  19:50    


 


Urine Methadone Screen  NEGATIVE  (NEGATIVE)   18  19:50    


 


Ur Barbiturates Screen  NEGATIVE  (NEGATIVE)   18  19:50    


 


Ur Tricyclics Screen  POSITIVE  (NEGATIVE)  H  18  19:50    


 


Ur Phencyclidine Scrn  NEGATIVE  (NEGATIVE)   18  19:50    


 


Amphetamines Screen  NEGATIVE  (NEGATIVE)   18  19:50    


 


U Methamphetamines Scrn  NEGATIVE  (NEGATIVE)   18  19:50    


 


U Benzodiazepines Scrn  POSITIVE  (NEGATIVE)  H  18  19:50    


 


U Cocaine Metab Screen  NEGATIVE  (NEGATIVE)   18  19:50    


 


U Cannabinoids Screen  NEGATIVE  (NEGATIVE)   18  19:50    


 


RPR  NONREACTIVE  (NONREACTIVE)   18  06:30    














- Physical Exam


Vitals and I&O: 


 Vital Signs











Temp  98.0 F   18 14:00


 


Pulse  76   18 14:00


 


Resp  18   18 14:00


 


BP  133/63   18 14:00


 


Pulse Ox  95   18 14:00








 Intake & Output











 18





 18:59 06:59 18:59


 


Intake Total 1200  


 


Balance 1200  


 


Intake:   


 


  Oral 1200  


 


Other:   


 


  # Bowel Movements 1  











Active Medications: 


Current Medications





Acetaminophen (Tylenol)  650 mg PO Q4H PRN


   PRN Reason: Pain or Fever >101


   Stop: 19 23:19


Al Hydrox/Mg Hydrox/Simethicone (Maalox)  30 ml PO Q4HR PRN


   PRN Reason: GI DISTRESS


   Stop: 19 22:34


Citalopram Hydrobromide (Celexa)  20 mg PO DAILY JESSICA; Protocol


   Stop: 19 08:59


   Last Admin: 18 08:58 Dose:  20 mg


Donepezil HCl (Aricept)  10 mg PO HS JESSICA


   Stop: 19 20:59


   Last Admin: 18 20:50 Dose:  10 mg


Guaifenesin/Dextromethorphan (Robitussin Dm)  10 ml PO Q6H PRN


   PRN Reason: Cough


   Stop: 19 23:19


Levetiracetam (Keppra)  1,000 mg PO BID JESSICA


   Stop: 19 08:59


   Last Admin: 18 16:17 Dose:  1,000 mg


Lorazepam (Ativan)  0.5 mg PO Q4HR PRN; Protocol


   PRN Reason: Anxiety


   Stop: 19 22:34


   Last Admin: 18 20:34 Dose:  0.5 mg


Magnesium Hydroxide (Milk Of Magnesia)  30 ml PO HS PRN


   PRN Reason: Constipation


Memantine (Namenda)  10 mg PO BID JESSICA


   Stop: 19 08:59


   Last Admin: 18 16:17 Dose:  10 mg


Multivitamins/Vitamin C (Theragran)  1 tab PO DAILY JESSICA


   Stop: 19 08:59


   Last Admin: 18 08:58 Dose:  1 tab


Quetiapine Fumarate (Seroquel)  25 mg PO BID JESSICA; Protocol


   Stop: 19 16:59


   Last Admin: 18 16:17 Dose:  25 mg


Zolpidem Tartrate (Ambien)  5 mg PO HS PRN


   PRN Reason: Insomnia


   Stop: 19 22:34


   Last Admin: 18 20:49 Dose:  5 mg








General: Alert, No acute distress


HEENT: Atraumatic


Neck: Supple


Cardiovascular: Regular rate


Lungs: Clear to auscultation


Abdomen: Bowel sounds


Extremities: Pulses


Neurological: Normal gait





Assessment/Plan





- Assessment


Assessment: 





UTI


HTN


h/o seizure disorder


dementia


confusion


agitation





- Plan


Plan: 





cpm





Nutritional Asmnt/Malnutr-PDOC





- Dietary Evaluation


Malnutrition Findings (Please click <Entered> for more info): 








Nutritional Asmnt/Malnutrition                             Start:  18 13:

10


Text:                                                      Status: Complete    

  


Freq:                                                                          

  


Protocol:                                                                      

  


 Document     18 13:10  JLI1  (Rec: 18 13:21  JLI1  CABRERA)


 Nutritional Asmnt/Malnutrition


     Patient General Information


      Nutritional Screening                      Moderate Risk


      Diagnosis                                  psychosis


      Pertinent Medical Hx/Surgical Hx           HTN, seizures, dementia


      Subjective Information                     Pt was in gerichair in the


                                                 dining room, confused, at time


                                                 of visit. Pt is a feeder and


                                                 eats well per RN. PO intake is


                                                 % per EMR.


      Current Diet Order/ Nutrition Support      regular


      Patient / S.O                              Can't verbalize diet edu


      Pertinent Medications                      theragran, seroquel, ambien


      Pertinent Labs                              Na 146


     Nutritional Hx/Data


      Height                                     1.75 m


      Height (Calculated Centimeters)            175.3


      Current Weight (lbs)                       56.699 kg


      Weight (Calculated Kilograms)              56.7


      Weight (Calculated Grams)                  96007.0


      Ideal Body Weight                          160


      Body Mass Index (BMI)                      18.4


      Weight Status                              Underweight


     GI Symptoms


      GI Symptoms                                None


      Last BM                                    


      Difficult in:                              None


      Food Allergies                             No


      Skin Integrity/Comment:                    intact, area of concern,


                                                 anderson 18


      Current %PO                                Good (%)


     Estimated Nutritional Goals


      BEE in Kcals:                              Using Current wt


      Calories/Kcals/Kg                          27-32


      Kcals Calculated                           8394-5271


      Protein:                                   Using Current wt


      Protein g/k


      Protein Calculated                         56


      Fluid: ml                                  3358-2918 (1ml/kcal)


     Nutritional Problem


      No current Nutrition Prob


       Problem                                   N/A


     Malnutrition Alert


      Is there a minimum of two criteria         No


       selected?                                 


       Query Text:Check all the applicable       


       criteria. A minimum of two criteria are   


       recommended for diagnosis of either       


       severe or non-severe malnutrition.        


     Malnutrition Related to Morbid Obesity


      Malnutrition related to morbid obesity     No


     Intervention/Recommendation


      Comments                                   1. Continue with regular diet


                                                 as ordered


                                                 2. Monitor PO intake, wt, labs


                                                 and skin integrity


                                                 3. F/U as low risk in 7 days,


                                                 , PO check 


     Expected Outcomes/Goals


      Expected Outcomes/Goals                    Goal: PO intake to meet at


                                                 least 75% of nutritional needs


                                                 .


                                                 Reviewed by Joanne Nugent RD

## 2018-12-30 NOTE — PROGRESS NOTES
DATE:  12/20/2018



SUBJECTIVE:  Staff was spoken to.  The patient is interviewed.  Mood is noted to

be irritable.  Affect is constricted.  The patient has been isolative and

withdrawn.  The patient verbalizations are noted to be very minimal.  Coping

skills at this time are noted to be very poor.  No side effects to the

medications are noted.  The patient is currently on 40 mg of citalopram.  Plan

to decrease it to 20 mg and the patient's blood work is going to be closely

monitored and patient is going to be encouraged to verbalize the concerns rather

than to act out.  The patient is currently on 25 mg twice a day of the Seroquel.

 Blood work has been reviewed and his eosinophils are 5.5, monocytes are noted

to be 11.5 and sodium is noted to be 146 and the patient has been noted to have

blood in the urine and WBC are noted to be 5200.  In view of this one, it is

requested that the patient be evaluated by the primary care physician for

urinary tract infection.





DD: 12/20/2018 18:11

DT: 12/21/2018 09:54

JOB# [id number]  [id number]
DATE:  12/21/2018



SUBJECTIVE:  Staff was spoken to.  The patient is interviewed.  Mood is noted to

be depressed.  Affect is constricted.  The patient is mumbling to self, not

making much sense.  Coping skills at this time are noted to be very poor.  Sleep

and appetite also noted to be limited.  The patient has not been presenting with

any concerns and the patient is a total care patient.  The patient is not able

to care for self.



ASSESSMENT:  The patient is still psychotic and depressed.



PLAN:  To continue the patient with the supportive therapy and followup.





DD: 12/21/2018 10:45

DT: 12/22/2018 00:13

JOB# [id number]  [id number]
DATE:  12/22/2018



SUBJECTIVE:  Staff was spoken to.  The patient is interviewed.  Mood is noted to

be dysphoric.  Coping skills are noted to be poor.  The patient is selectively

mute.  Insight and judgment are noted to be still impaired.  Impulse control is

also noted to be limited.  No side effects to medications are noted.



ASSESSMENT:  The patient is still grossly psychotic.



PLAN:  To continue the patient with the supportive therapy, encouraged the

patient to verbalize the concerns rather than to act out.  Please note that the

patient is not ready to be discharged to a lower level of care yet.





DD: 12/22/2018 12:18

DT: 12/22/2018 22:57

JOB# [id number]  [id number]
DATE:  12/23/2018



This is Dr. Goode covering for Dr. Kincaid.



IDENTIFYING DATA:  A 58-year-old male brought in here by his family and admitted

from [facility] in [place].  The patient initially presented very

selectively mute, mumbling to himself, disorganized, reporting a loss recently

about 35 pounds in the last month and becoming very forgetful and disengaged

with a diagnosis of Alzheimer's type with behavior changes.



CURRENT MEDICATIONS:  Include the following:  Aricept 10 mg a day, Keppra 1000

mg twice a day, Ativan as needed, Namenda 10 mg p.o. b.i.d., quetiapine 25 mg

b.i.d., and Bactrim.



HOSPITAL COURSE:  Nursing staff has noted the patient continues to be

selectively mute, responding, and thought blocking.



Today on face-to-face evaluation, the patient is noted to be responding with

some thought blocking.  When attempted to ask him questions, he becomes more

irritable and impaired.



MENTAL STATUS EXAMINATION:  Selectively mute, disengaged, poor insight, judgment

impaired is noted.  Denies any side effects of medication and responding with

thought blocking.



ASSESSMENT AND PLAN:  The patient continuous to observed to be stressful by the

thought blocking and disengaged.  We will continue with primary psychiatric

treatment plan and goal.  He is able to tolerate medication without

complications or side effects of the medications.





DD: 12/23/2018 07:04

DT: 12/24/2018 00:13

JOB# [id number]  [id number]
DATE:  12/24/2018



I am covering for Dr. Kincaid.



SUBJECTIVE:  The patient was seen and evaluated.  The patient is a 58-year-old

male with a history of schizophrenia.



CURRENT MEDICATIONS:  Aricept, Keppra, Ativan as needed, Namenda, and

quetiapine.



Overnight, nursing staff reported that the patient continues to have severe

thought blocking and trying to attempt to AWOL.



Today on face-to-face evaluation, the patient is selectively mute, disengaging,

minimally interactive, and is noted to be responding to hearing internal voices.



ASSESSMENT/PLAN:  Due to the patient's distressful emotions and because of

voices, unable to formulate a treatment plan and also disorganized thought

process impairs the ability to engage in a linear conversation.  We will

continue with the primary psychiatrist's plan to continue to build a therapeutic

dose.





DD: 12/24/2018 06:50

DT: 12/25/2018 04:23

JOB# [id number]  [id number]
DATE:  12/25/2018



SUBJECTIVE:  Staff was spoken to.  The patient is interviewed.  Mood is noted to

be irritable.  Affect is constricted.  The patient continues to be responding to

internal stimuli, not making much sense.  The patient has been mumbling to self,

not making any eye contact.  No side effects to the medications are noted.



ASSESSMENT:  The patient is still psychotic.



PLAN:  To continue the patient with the Seroquel and follow the patient with the

supportive therapy.





DD: 12/25/2018 18:10

DT: 12/26/2018 01:36

JOB# [id number]  [id number]
DATE:  12/26/2018



PSYCHIATRIC PROGRESS NOTE



SUBJECTIVE:  Staff was spoken to.  The patient is interviewed.  Mood is noted to

be less irritable.  The patient is isolative and withdrawn.  The patient is

still mumbling to self, not able to articulate any of the concerns.  No major

behavioral problems reported.



ASSESSMENT:  The patient is still psychotic.



PLAN:  To continue the patient with the supportive therapy and encourage the

patient to verbalize the concerns rather than to act out.





DD: 12/26/2018 18:03

DT: 12/26/2018 21:23

JOB# [id number]  [id number]
DATE:  12/27/2018



PSYCHIATRIC PROGRESS NOTE



SUBJECTIVE:  Staff was spoken to.  The patient is interviewed.  Mood is noted to

be less irritable.  Affect is appropriate.  The patient is selectively mute and

is mumbling to self most of the time.  No side effects to the medications are

noted.  Coping skills are noted to be poor.  No major behavioral problems are

reported by the staff.



ASSESSMENT:  The patient is still psychotic.



PLAN:  To continue the patient with the supportive therapy and followup.





DD: 12/27/2018 18:32

DT: 12/27/2018 23:04

JOB# [id number]  [id number]
DATE:  12/28/2018



PSYCHIATRIC PROGRESS NOTE



SUBJECTIVE:  Staff was spoken to.  The patient is interviewed.  Mood is noted to

be irritable.  Affect is constricted.  The patient's coping skills are noted to

be fair.  No side effects to the medications are noted.  The patient is

currently on citalopram and Seroquel and has been able to tolerate.  No major

behavioral problems are noted.  The patient, however, tends to mumble to himself

and is not able to verbalize much.



ASSESSMENT AND PLAN:  The patient is not noted to be a danger to self or others

at this time and hence, he is going to be discharged for followup on outpatient

basis.





DD: 12/28/2018 07:06

DT: 12/28/2018 09:22

JOB# [id number]  [id number]
DATE:  12/29/2018



SUBJECTIVE:  Staff was spoken to.  The patient is interviewed.  Mood is noted to

be anxious.  Affect is constricted.  The patient's insight and judgment are

noted to be still impaired.  Impulse control seems to be improving.  No side

effects to the medications are noted.  The patient is mumbling to self, not

making much sense.  No behavioral problems are noted.  The placement has been

becoming a problem.  The patient is currently on Seroquel and citalopram and has

been able to tolerate the medication.  No side effects to the medications are

noted.



ASSESSMENT:  The patient is still paranoid and awaiting placement.



PLAN:  To continue the patient with the current medications and follow.





DD: 12/29/2018 09:51

DT: 12/29/2018 23:02

JOB# [id number]  [id number]
DATE:  12/30/2018



SUBJECTIVE:  Staff was spoken to.  The patient is interviewed.  Mood is noted to

be irritable.  Affect is constricted.  Insight and judgment at this time are

noted to be still impaired.  Impulse control is noted to be limited.  The

patient has been having difficult time to cope with the stress.  The patient's

placement is becoming a problem and the patient has no place to return to.



ASSESSMENT:  The patient is still confused and paranoid.



PLAN:  To continue the patient with the current medications and closely work

with the  with regard to finding.





DD: 12/30/2018 11:50

DT: 12/30/2018 15:38

JOB# [id number]  [id number]
Giselle Armendariz(PA)

## 2018-12-30 NOTE — HISTORY & PHYSICAL
ADMIT DATE:  12/30/2018



HISTORY OF PRESENT ILLNESS:  The patient was admitted to Geropsych Unit for

psychosis and has had UTI, which was treated with some antibiotic apparently and

the patient also had tonic-clonic seizures, was in postictal state and the

patient was brought to the medical floor, unable to get much history.  The

patient is postictal.  He received 1 mg of Ativan, so he is lethargic.



PHYSICAL EXAMINATION: 

HEAD:  Otherwise normal.

ENT:  Normal.

NECK:  Supple, nontender.

LUNGS:  Clear.

CARDIOVASCULAR SYSTEM:  S1 and S2 heard.

ABDOMEN:  Soft.  Bowel sounds are heard.

CENTRAL NERVOUS SYSTEM:  Decreased sensorium and lethargy, status post seizure. 





DIAGNOSES:  Postictal state, status post seizure; history of urinary tract

infection and history of psychosis.



PLAN:  The patient is being admitted and will go ahead and do the CAT scan of

the head tomorrow and will have a neurological workup done and will also have

sepsis work as well.





DD: 12/30/2018 18:55

DT: 12/30/2018 21:34

JOB# [id number]  [id number]

## 2018-12-31 NOTE — PROGRESS NOTES
DATE:  12/31/2018



Staff was spoken to.  The patient is interviewed.  The patient is seen following

the transfer to the medical unit after he sustained a seizure and the patient is

currently on 25 mg of the Seroquel and has been able to tolerate.  No side

effects to the medications are noted.  The patient is on 20 mg of the

citalopram.  The patient has been closely monitored for any seizures.  The

patient is currently on Keppra 1000 mg twice a day.  The patient is still

responding to internal stimuli and is not able to contract for safety.  The

patient needs to be closely monitored and followed up.





DD: 12/31/2018 11:45

DT: 12/31/2018 12:43

JOB# [id number]  [id number]

## 2019-01-01 NOTE — GENERAL PROGRESS NOTE
Objective





- Results


Result Diagrams: 


 18 19:16





 18 19:16


Recent Labs: 


 Laboratory Last Values











WBC  9.7 Th/cmm (4.8-10.8)   18  19:16    


 


RBC  4.25 Mil/cmm (4.30-5.70)  L  18  19:16    


 


Hgb  13.1 gm/dL (12-16)   18  19:16    


 


Hct  40.2 % (41.0-60)  L  18  19:16    


 


MCV  94.7 fl (80-99)   18  19:16    


 


MCH  30.8 pg (26.0-30.0)  H  18  19:16    


 


MCHC Differential  32.5 pg (28.0-36.0)   18  19:16    


 


RDW  13.2 % (11.5-20.0)   18  19:16    


 


Plt Count  247 Th/cmm (150-400)   18  19:16    


 


MPV  8.5 fl  18  19:16    


 


Neutrophils %  83.2 % (40.0-80.0)  H  18  19:16    


 


Lymphocytes %  7.4 % (20.0-50.0)  L  18  19:16    


 


Monocytes %  7.5 % (2.0-10.0)   18  19:16    


 


Eosinophils %  1.1 % (0.0-5.0)   18  19:16    


 


Basophils %  0.8 % (0.0-2.0)   18  19:16    


 


Sodium  139 mEq/L (136-145)   18  19:16    


 


Potassium  3.7 mEq/L (3.5-5.1)   18  19:16    


 


Chloride  104 mEq/L ()   18  19:16    


 


Carbon Dioxide  28.1 mEq/L (21.0-31.0)   18  19:16    


 


Anion Gap  10.6  (7.0-16.0)   18  19:16    


 


BUN  20 mg/dL (7-25)   18  19:16    


 


Creatinine  0.7 mg/dL (0.7-1.3)   18  19:16    


 


Est GFR ( Amer)  > 60.0 ml/min (>90)   18  19:16    


 


Est GFR (Non-Af Amer)  > 60.0 ml/min  18  19:16    


 


BUN/Creatinine Ratio  28.6   18  19:16    


 


Glucose  187 mg/dL ()  H  18  19:16    


 


Calcium  9.3 mg/dL (8.6-10.3)   18  19:16    














- Physical Exam


Vitals and I&O: 


 Vital Signs











Temp  97.6 F   19 12:00


 


Pulse  47   19 12:00


 


Resp  17   19 18:00


 


BP  131/42   19 12:00


 


Pulse Ox  97   19 12:00








 Intake & Output











 18





 18:59 06:59 18:59


 


Intake Total 1000  1620


 


Balance 1000  1620


 


Weight (lbs)  56.699 kg 56.608 kg


 


Intake:   


 


  Intake, IV Amount 1000  970


 


    D5-0.9%Ns 1,000 ml @ 100 1000 2000 970





    mls/hr IV .Q10H JESSICA Rx#:   





    [id number]   


 


  Oral   650


 


Other:   


 


  # Voids  3 3


 


  # Bowel Movements  0 


 


  Weight Source  Bedscale Bedscale











Active Medications: 


Current Medications





Acetaminophen (Tylenol)  650 mg PO Q4H PRN


   PRN Reason: Pain or Fever >101


   Stop: 19 18:54


Al Hydrox/Mg Hydrox/Simethicone (Maalox)  30 ml PO Q4HR PRN


   PRN Reason: GI DISTRESS


   Stop: 19 18:54


Citalopram Hydrobromide (Celexa)  20 mg PO DAILY [facility]; Protocol


   Stop: 19 08:59


Donepezil HCl (Aricept)  10 mg PO HS JESSICA


   Stop: 19 20:59


   Last Admin: 18 20:41 Dose:  10 mg


Guaifenesin/Dextromethorphan (Robitussin Dm)  10 ml PO Q6H PRN


   PRN Reason: Cough


   Stop: 19 18:54


Dextrose/Sodium Chloride (D5-0.9%Ns)  1,000 mls @ 100 mls/hr IV .Q10H JESSICA


   Stop: 19 18:59


   Last Admin: 19 18:54 Dose:  100 mls/hr


Levetiracetam (Keppra)  1,000 mg PO BID JESSICA


   Stop: 19 08:59


   Last Admin: 19 16:44 Dose:  1,000 mg


Lorazepam (Ativan)  0.5 mg PO Q4HR PRN; Protocol


   PRN Reason: Anxiety


   Stop: 19 18:54


Lorazepam (Ativan)  2 mg PO HS JESSICA


   Stop: 19 20:59


Magnesium Hydroxide (Milk Of Magnesia)  30 ml PO HS PRN


   PRN Reason: Constipation


   Stop: 19 18:54


Memantine (Namenda)  10 mg PO BID JESSICA


   Stop: 19 08:59


   Last Admin: 19 16:44 Dose:  10 mg


Quetiapine Fumarate (Seroquel)  25 mg PO BID [facility]; Protocol


   Stop: 19 08:59


Zolpidem Tartrate (Ambien)  5 mg PO HS PRN


   PRN Reason: Insomnia


   Stop: 19 18:54











Nutritional Asmnt/Malnutr-PDOC





- Dietary Evaluation


Malnutrition Findings (Please click <Entered> for more info): 








Nutritional Asmnt/Malnutrition                             Start:  18 16:

55


Text:                                                      Status: Complete    

  


Freq:                                                                          

  


Protocol:                                                                      

  


 Document     18 16:55  LCHENG  (Rec: 18 17:24  LCHENG  CABRERA)


 Nutritional Asmnt/Malnutrition


     Patient General Information


      Nutritional Screening                      High Risk


      Diagnosis                                  Hyperglycemic


      Pertinent Medical Hx/Surgical Hx           Seizure, UTI


      Subjective Information                     Patient was eating lunch at


                                                 time of visit, fed by CNA,


                                                 almost finished 100%. Glucose


                                                 187 at admission noted.


      Current Diet Order/ Nutrition Support      [facility] Soft Chopped


      Pertinent Medications                      Maalox, D5 0.9% NaCl, MOM,


                                                 Seroquel


      Pertinent Labs                             () Gluc 187, Hct 40


     Nutritional Hx/Data


      Height                                     1.75 m


      Height (Calculated Centimeters)            175.3


      Current Weight (lbs)                       56.699 kg


      Weight (Calculated Kilograms)              56.7


      Weight (Calculated Grams)                  50925.0


      Ideal Body Weight                          160


      % Ideal Body Weight                        78


      Body Mass Index (BMI)                      18.4


      Recent Weight Change                       No


      Weight Status                              Underweight


     GI Symptoms


      GI Symptoms                                None


      Last BM                                    no record


      Difficult in:                              None


      Food Allergies                             No


      Cultural/Ethnic/Mandaen Belief           Non specified


      Skin Integrity/Comment:                    Initial Arnaldo score of 19


                                                 upon admission now 16


                                                 skin intact


      Current %PO                                Good (%)


     Estimated Nutritional Goals


      BEE in Kcals:                              Using Current wt


      Calories/Kcals/Kg                          30-32


      Kcals Calculated                           6098-2067


      Protein:                                   Using Current wt


      Protein g/k.8-1.2


      Protein Calculated                         46-68


      Fluid: ml                                  4568-6383


     Nutritional Problem


      1. Problem


       Problem                                   altered nutrition related labs


       Etiology                                  hyperglycemia


       Signs/Symptoms:                           glucose 187 at admission


     Intervention/Recommendation


      Comments                                   1. Continue [facility] soft Chopped


                                                 diet as tolerated.


                                                 2. Consider checking HbA1C,


                                                 consider change diet to [facility]


                                                 if at risk for DM


                                                 3. F/U as low risk in 7 days,


                                                 PO check 1/3


     Expected Outcomes/Goals


      Expected Outcomes/Goals                    1. Stable Weight, meet


                                                 nutritional needs


                                                 2. Pt labs return to normal


                                                 levels

## 2019-01-01 NOTE — PROGRESS NOTES
DATE:  01/01/2019



PSYCHIATRIC PROGRESS NOTE



SUBJECTIVE:  Staff was spoken to.  The patient is interviewed.  Mood is noted to

be irritable.  Affect is constricted.  The patient has been subdued to some

extent and has not been presenting with any major problems.  The patient has

paranoia, but mumbles to himself most of the time.  No side effects to the

medications are noted.  The patient is on a very low dose of Seroquel.  The

patient is being closely monitored for any seizures.



ASSESSMENT:  The patient's psychosis is resolving.



PLAN:  To continue the patient with supportive therapy and follow up.





DD: 01/01/2019 14:33

DT: 01/01/2019 21:27

JOB# [id number]  [id number]

## 2019-01-02 NOTE — PROGRESS NOTES
DATE:  01/02/2019



PSYCHIATRIC PROGRESS NOTE



SUBJECTIVE:  Staff was spoken to.  The patient is interviewed.  Mood is noted to

be less irritable.  Affect is constricted.  The patient is looking blankly into

the space and the patient has been paranoid, but mumbling to self, but no major

behavioral problems are reported.



ASSESSMENT:  The patient is still psychotic.



PLAN:  To continue the patient with the supportive therapy and followup.





DD: 01/02/2019 21:32

DT: 01/02/2019 23:34

[facility]# [id number]  [id number]

## 2019-01-02 NOTE — CONSULTATION
DATE OF CONSULTATION:  01/02/2019



NEUROLOGY CONSULTATION



HISTORY OF PRESENT ILLNESS:  The patient in Geropsych Unit for psychosis.  The

patient also has UTI.



The patient has tonic-clonic seizure.  The patient initially agitated, now

quiet, lying in bed, eyes closed.  He will open to his name, but not much more. 

He will withdraw upper and lower extremity.  No seizures again.



PAST MEDICAL HISTORY:

1.  The patient has history of psychosis.

2.  Urinary tract infection.



MEDICATIONS:

1.  Celexa.

2.  Aricept.

3.  Keppra 1000 b.i.d.

4.  Lorazepam p.r.n.

5.  Namenda 10 mg b.i.d.

6.  Quetiapine 25 mg b.i.d.

7.  Ambien 5 mg.



REVIEW OF SYSTEMS:  Twelve point negative except for above.



PHYSICAL EXAMINATION:

VITAL SIGNS:  Temperature 97.9, blood pressure 122/75, and pulse is 60.

NECK:  Supple, no bruits.

HEART:  Sounds S1, S2.

LUNGS:  Clear.

NEUROLOGIC:  The patient is lying in bed.  Eyes closed.  He will open to his

name.  Does not talk much to me.  Follows simple instructions, will withdraw

upper and lower extremity.  Pupils react to light.  The patient has full eye

movement.  Reflex about 1+.



INVESTIGATIONS:  WBC 6.3 and hemoglobin 13.0.  Sodium 139, potassium 3.9, and

calcium 9.3.



IMPRESSION:

1.  Seizure.

2.  History of psychosis.

3.  Encephalopathy.

4.  The patient has urinary tract infection.



PLAN: 

1.  CT scan of the head.

2.  Continue with Keppra.





DD: 01/02/2019 08:38

DT: 01/02/2019 15:19

JOB# [id number]  [id number]

## 2019-01-02 NOTE — GENERAL PROGRESS NOTE
Subjective





- Review of Systems


Events since last encounter: 





pt. has psychosis, improving





Objective





- Results


Result Diagrams: 


 19 08:15





 19 08:15


Recent Labs: 


 Laboratory Last Values











WBC  6.3 Th/cmm (4.8-10.8)   19  08:15    


 


RBC  4.20 Mil/cmm (4.30-5.70)  L  19  08:15    


 


Hgb  13.0 gm/dL (12-16)   19  08:15    


 


Hct  39.9 % (41.0-60)  L  19  08:15    


 


MCV  95.0 fl (80-99)   19  08:15    


 


MCH  30.9 pg (26.0-30.0)  H  19  08:15    


 


MCHC Differential  32.5 pg (28.0-36.0)   19  08:15    


 


RDW  12.8 % (11.5-20.0)   19  08:15    


 


Plt Count  199 Th/cmm (150-400)   19  08:15    


 


MPV  9.0 fl  19  08:15    


 


Neutrophils %  77.5 % (40.0-80.0)   19  08:15    


 


Lymphocytes %  13.4 % (20.0-50.0)  L  19  08:15    


 


Monocytes %  5.2 % (2.0-10.0)   19  08:15    


 


Eosinophils %  2.7 % (0.0-5.0)   19  08:15    


 


Basophils %  1.2 % (0.0-2.0)   19  08:15    


 


Sodium  138 mEq/L (136-145)   19  08:15    


 


Potassium  3.9 mEq/L (3.5-5.1)   19  08:15    


 


Chloride  104 mEq/L ()   19  08:15    


 


Carbon Dioxide  28.1 mEq/L (21.0-31.0)   19  08:15    


 


Anion Gap  9.8  (7.0-16.0)   19  08:15    


 


BUN  8 mg/dL (7-25)   19  08:15    


 


Creatinine  0.7 mg/dL (0.7-1.3)   19  08:15    


 


Est GFR ( Amer)  > 60.0 ml/min (>90)   19  08:15    


 


Est GFR (Non-Af Amer)  > 60.0 ml/min  19  08:15    


 


BUN/Creatinine Ratio  11.4   19  08:15    


 


Glucose  117 mg/dL ()  H  19  08:15    


 


Calcium  8.9 mg/dL (8.6-10.3)   19  08:15    


 


Ammonia  35 umol/L (16-53)   19  08:15    














- Physical Exam


Vitals and I&O: 


 Vital Signs











Temp  97.6 F   19 12:00


 


Pulse  47   19 12:00


 


Resp  18   19 14:00


 


BP  131/42   19 12:00


 


Pulse Ox  99   19 08:00








 Intake & Output











 19





 18:59 06:59 18:59


 


Intake Total 1620 1240 


 


Balance 1620 1240 


 


Weight (lbs) 56.608 kg 56.608 kg 


 


Intake:   


 


  Intake, IV Amount 970 1000 


 


    D5-0.9%Ns 1,000 ml @  





    mls/hr IV .Q10H [facility] Rx#:   





    [id number]   


 


  Oral 650 240 


 


Other:   


 


  # Voids 3 3 


 


  # Bowel Movements  0 


 


  Weight Source Bedscale Bedscale 











Active Medications: 


Current Medications





Acetaminophen (Tylenol)  650 mg PO Q4H PRN


   PRN Reason: Pain or Fever >101


   Stop: 19 18:54


Al Hydrox/Mg Hydrox/Simethicone (Maalox)  30 ml PO Q4HR PRN


   PRN Reason: GI DISTRESS


   Stop: 19 18:54


Citalopram Hydrobromide (Celexa)  20 mg PO DAILY [facility]; Protocol


   Stop: 19 08:59


Donepezil HCl (Aricept)  10 mg PO HS JESSICA


   Stop: 19 20:59


   Last Admin: 19 21:31 Dose:  10 mg


Guaifenesin/Dextromethorphan (Robitussin Dm)  10 ml PO Q6H PRN


   PRN Reason: Cough


   Stop: 19 18:54


Dextrose/Sodium Chloride (D5-0.9%Ns)  1,000 mls @ 100 mls/hr IV .Q10H JESSICA


   Stop: 19 18:59


   Last Admin: 19 05:01 Dose:  100 mls/hr


Levetiracetam (Keppra)  1,000 mg PO BID JESSICA


   Stop: 19 08:59


   Last Admin: 19 09:27 Dose:  1,000 mg


Lorazepam (Ativan)  0.5 mg PO Q4HR PRN; Protocol


   PRN Reason: Anxiety


   Stop: 19 18:54


Lorazepam (Ativan)  2 mg PO HS JESSICA


   Stop: 19 20:59


Magnesium Hydroxide (Milk Of Magnesia)  30 ml PO HS PRN


   PRN Reason: Constipation


   Stop: 19 18:54


Memantine (Namenda)  10 mg PO BID JESSICA


   Stop: 19 08:59


   Last Admin: 19 09:27 Dose:  10 mg


Quetiapine Fumarate (Seroquel)  25 mg PO BID [facility]; Protocol


   Stop: 19 08:59


Zolpidem Tartrate (Ambien)  5 mg PO HS PRN


   PRN Reason: Insomnia


   Stop: 19 18:54








General: Alert, No acute distress


HEENT: Atraumatic, PERRLA, EOMI


Neck: Supple


Cardiovascular: Regular rate


Lungs: Clear to auscultation


Abdomen: Bowel sounds


Psych/Mental Status: Other (psychosis)





Assessment/Plan





- Assessment


Assessment: 





postictal state


s/p seizure


h/o uti


h/o psychosis 





- Plan


Plan: 





seizure precaution


cpm





Nutritional Asmnt/Malnutr-PDOC





- Dietary Evaluation


Malnutrition Findings (Please click <Entered> for more info): 








Nutritional Asmnt/Malnutrition                             Start:  18 16:

55


Text:                                                      Status: Complete    

  


Freq:                                                                          

  


Protocol:                                                                      

  


 Document     18 16:55  LCHENG  (Rec: 18 17:24  LCHENG  CABRERA)


 Nutritional Asmnt/Malnutrition


     Patient General Information


      Nutritional Screening                      High Risk


      Diagnosis                                  Hyperglycemic


      Pertinent Medical Hx/Surgical Hx           Seizure, UTI


      Subjective Information                     Patient was eating lunch at


                                                 time of visit, fed by CNA,


                                                 almost finished 100%. Glucose


                                                 187 at admission noted.


      Current Diet Order/ Nutrition Support      [facility] Soft Chopped


      Pertinent Medications                      Maalox, D5 0.9% NaCl, MOM,


                                                 Seroquel


      Pertinent Labs                             () Gluc 187, Hct 40


     Nutritional Hx/Data


      Height                                     1.75 m


      Height (Calculated Centimeters)            175.3


      Current Weight (lbs)                       56.699 kg


      Weight (Calculated Kilograms)              56.7


      Weight (Calculated Grams)                  14199.0


      Ideal Body Weight                          160


      % Ideal Body Weight                        78


      Body Mass Index (BMI)                      18.4


      Recent Weight Change                       No


      Weight Status                              Underweight


     GI Symptoms


      GI Symptoms                                None


      Last BM                                    no record


      Difficult in:                              None


      Food Allergies                             No


      Cultural/Ethnic/Jehovah's witness Belief           Non specified


      Skin Integrity/Comment:                    Initial Arnaldo score of 19


                                                 upon admission now 16


                                                 skin intact


      Current %PO                                Good (%)


     Estimated Nutritional Goals


      BEE in Kcals:                              Using Current wt


      Calories/Kcals/Kg                          30-32


      Kcals Calculated                           6408-8716


      Protein:                                   Using Current wt


      Protein g/k.8-1.2


      Protein Calculated                         46-68


      Fluid: ml                                  5244-5681


     Nutritional Problem


      1. Problem


       Problem                                   altered nutrition related labs


       Etiology                                  hyperglycemia


       Signs/Symptoms:                           glucose 187 at admission


     Intervention/Recommendation


      Comments                                   1. Continue [facility] soft Chopped


                                                 diet as tolerated.


                                                 2. Consider checking HbA1C,


                                                 consider change diet to [facility]


                                                 if at risk for DM


                                                 3. F/U as low risk in 7 days,


                                                 PO check 1/3


     Expected Outcomes/Goals


      Expected Outcomes/Goals                    1. Stable Weight, meet


                                                 nutritional needs


                                                 2. Pt labs return to normal


                                                 levels

## 2019-01-30 ENCOUNTER — HOSPITAL ENCOUNTER (INPATIENT)
Dept: HOSPITAL 36 - ER | Age: 59
LOS: 2 days | Discharge: ANOTHER HOSPITAL | DRG: 885 | End: 2019-02-01
Attending: PSYCHIATRY & NEUROLOGY | Admitting: PSYCHIATRY & NEUROLOGY
Payer: MEDICARE

## 2019-01-30 VITALS — DIASTOLIC BLOOD PRESSURE: 87 MMHG | SYSTOLIC BLOOD PRESSURE: 137 MMHG

## 2019-01-30 DIAGNOSIS — R56.9: ICD-10-CM

## 2019-01-30 DIAGNOSIS — K21.9: ICD-10-CM

## 2019-01-30 DIAGNOSIS — Z82.49: ICD-10-CM

## 2019-01-30 DIAGNOSIS — F29: ICD-10-CM

## 2019-01-30 DIAGNOSIS — I10: ICD-10-CM

## 2019-01-30 DIAGNOSIS — E78.5: ICD-10-CM

## 2019-01-30 DIAGNOSIS — F25.9: Primary | ICD-10-CM

## 2019-01-30 DIAGNOSIS — F03.90: ICD-10-CM

## 2019-01-30 LAB
ALBUMIN SERPL-MCNC: 4 GM/DL (ref 4.2–5.5)
ALBUMIN/GLOB SERPL: 1.9 {RATIO} (ref 1–1.8)
ALP SERPL-CCNC: 76 U/L (ref 34–104)
ALT SERPL-CCNC: 19 U/L (ref 7–52)
ANION GAP SERPL CALC-SCNC: 10.5 MMOL/L (ref 7–16)
APAP SERPL-MCNC: < 10 UG/ML (ref 10–30)
AST SERPL-CCNC: 15 U/L (ref 13–39)
BASOPHILS # BLD AUTO: 0.1 TH/CUMM (ref 0–0.2)
BASOPHILS NFR BLD AUTO: 1.4 % (ref 0–2)
BILIRUB SERPL-MCNC: 0.5 MG/DL (ref 0.3–1)
BUN SERPL-MCNC: 16 MG/DL (ref 7–25)
CALCIUM SERPL-MCNC: 9.2 MG/DL (ref 8.6–10.3)
CHLORIDE SERPL-SCNC: 104 MEQ/L (ref 98–107)
CHOLEST SERPL-MCNC: 164 MG/DL (ref ?–200)
CO2 SERPL-SCNC: 30.6 MEQ/L (ref 21–31)
CREAT SERPL-MCNC: 0.8 MG/DL (ref 0.7–1.3)
EOSINOPHIL # BLD AUTO: 0.3 TH/CMM (ref 0.1–0.4)
EOSINOPHIL NFR BLD AUTO: 4.3 % (ref 0–5)
ERYTHROCYTE [DISTWIDTH] IN BLOOD BY AUTOMATED COUNT: 13.2 % (ref 11.5–20)
GLOBULIN SER-MCNC: 2.1 GM/DL
GLUCOSE SERPL-MCNC: 92 MG/DL (ref 70–105)
HCT VFR BLD CALC: 40.1 % (ref 41–60)
HDLC SERPL-MCNC: 67 MG/DL (ref 23–92)
HGB BLD-MCNC: 13.2 GM/DL (ref 12–16)
LYMPHOCYTE AB SER FC-ACNC: 1.5 TH/CMM (ref 1.5–3)
LYMPHOCYTES NFR BLD AUTO: 24.4 % (ref 20–50)
MCH RBC QN AUTO: 31.6 PG (ref 26–30)
MCHC RBC AUTO-ENTMCNC: 32.9 PG (ref 28–36)
MCV RBC AUTO: 96 FL (ref 80–99)
MONOCYTES # BLD AUTO: 0.9 TH/CMM (ref 0.3–1)
MONOCYTES NFR BLD AUTO: 14.3 % (ref 2–10)
NEUTROPHILS # BLD: 3.2 TH/CMM (ref 1.8–8)
NEUTROPHILS NFR BLD AUTO: 55.6 % (ref 40–80)
PLATELET # BLD: 203 TH/CMM (ref 150–400)
PMV BLD AUTO: 8.1 FL
POTASSIUM SERPL-SCNC: 4.1 MEQ/L (ref 3.5–5.1)
RBC # BLD AUTO: 4.18 MIL/CMM (ref 4.3–5.7)
SALICYLATES SERPL-MCNC: < 25 MG/L (ref 30–100)
SODIUM SERPL-SCNC: 141 MEQ/L (ref 136–145)
TRIGL SERPL-MCNC: 97 MG/DL (ref ?–150)
WBC # BLD AUTO: 6 TH/CMM (ref 4.8–10.8)

## 2019-01-30 PROCEDURE — Z7610: HCPCS

## 2019-01-30 NOTE — ED PHYSICIAN CHART
ED Chief Complaint/HPI





- Patient Information


Date Seen:: 01/30/19


Time Seen:: 16:50


Chief Complaint:: Agitation


History of Present Illness:: 





onset x 3 days of agitation and aggressive behavior; no report of trauma, LOC, H

/As, S/T, neck pain, C/P, SOB, Abd. Pain, A/N/V/D/C, fever, chills, SIs, or 

urinary s/s


Allergies:: 


 Allergies











Allergy/AdvReac Type Severity Reaction Status Date / Time


 


No Known Allergies Allergy   Verified 12/18/18 15:26











Vitals:: 


 Vital Signs - 8 hr











  01/30/19





  16:52


 


Temp 98.8 F


 


HR 48


 


RR 16


 


/75


 


O2 Sat % 96











Historian:: Patient, EMS


Review:: Nurse's Note Reviewed, Old Chart Reviewed, EMS run form Reviewed





ED Review of Systems





- Review of Systems


General/Constitutional: No fever, No chills, No weight loss, No weakness, No 

diaphoresis, No edema, No loss of appetite


Skin: No skin lesions, No rash, No bruising


Head: No headache, No light-headedness


Eyes: No loss of vision, No pain, No diplopia


ENT: No earache, No nasal drainage, No sore throat, No tinnitus


Neck: No neck pain, No swelling, No thyromegaly, No stiffness, No mass noted


Cardio Vascular: No chest pain, No palpitations, No PND, No orthopnea, No edema


Pulmonary: No SOB, No cough, No sputum, No wheezing


GI: No nausea, No vomiting, No diarrhea, No pain, No melena, No hematochezia, 

No constipation, No hematemesis


G/U: No dysuria, No frequency, No hematuria, No nacturia


Musculoskeletal: No bone or joint pain, No back pain, No muscle pain


Endocrine: No polyuria, No polydipsia


Psychiatric: Prior psych history, Depression, Anxiety, No suicidal ideation, No 

homicidal ideation, No auditory hallucination, No visual hallucination


Hematopoietic: No bruising, No lymphadenopathy


Allergic/Immuno: No urticaria, No angioedema


Neurological: No syncope, No focal symptoms, No weakness, No paresthesia, No 

headache, Seizure, No dizziness, Confusion, No vertigo





ED Past Medical History





- Past Medical History


Obtainable: Yes


Past Medical History: HTN, Dyslipidemia, PUD/GERD, Seizures, Dementia


Family History: HTN


Social History: Non Smoker, No Alcohol, No Drug Use, Single, Care Facility


Surgical History: None


Psychiatricy History: Depression, Bipolar, Dementia


Medication: Reviewed





Family Medical History





- Family Member


  ** Mother


History Unknown: Yes





ED Physical Exam





- Physical Examination


General/Constitutional: Awake, Well-developed, well-nourished, Alert, No 

distress, GCS 15, Non-toxic appearing, Ambulatory


Head: Atraumatic


Eyes: Lids, conjuctiva normal, PERRL, EOMI


Skin: Nl inspection, No rash, No skin lesions, No ecchymosis, Well hydrated, No 

lymphadenopathy


ENMT: External ears, nose nl, TM canals nl, Nasal exam nl, Lips, teeth, gums nl

, Oropharynx nl, Tonsils nl


Neck: Nontender, Full ROM w/o pain, No JVD, No nuchal rigidity, No bruit, No 

mass, No stridor


Respiratory: Nl effort/Exclusion, Clear to Auscultation, No Wheeze/Rhonchi/Rales


Cardio Vascular: RRR, No murmur, gallop, rubs, NL S1 S2, Carotid/Femoral/Distal 

pulses equal bilaterally


GI: No tenderness/rebounding/guarding, No organomegaly, No hernia, Normal BS's, 

Nondistended, No mass/bruits, No McBurney tenderness


Other GI comments:: 





no pulsatile masses


: No CVA tenderness


Extremities: No tenderness or effusion, Full ROM, normal strength in all 

extremities, No edema, Normal digits & nails


Neuro/Psych: Alert/oriented, DTR's symmetric, Normal sensory exam, Normal motor 

strength, Judgement/insight normal, Mood normal, Normal gait, No focal deficits


Other Neuro/Psych comments:: 





no focal signs; MSE: + Psychomotor Agitation; no SIs; Mood/Affect: Labile


Misc: Normal back, No paraspinal tenderness





ED Labs/Radiology/EKG Results





- Lab Results


Comments:: 





Reviewed





- EKG Interpretations


EKG Time:: 17:04


Rate & Rhythm: 61; NSR


Comments:: 





non-specific st-t changes





ED Septic Shock





- .


Is Septic Shock (SBP<90, OR Lactate>4 mmol\L) present?: No





- <6hrs of presentation:


Vital Signs: 


 Vital Signs - 8 hr











  01/30/19





  16:52


 


Temp 98.8 F


 


HR 48


 


RR 16


 


/75


 


O2 Sat % 96














ED Reassessment (Disposition)





- Reassessment


Reassessment Condition:: Improved





- Diagnosis


Diagnosis:: 





Agitation; Medical Clearance; Psychosis; Dementia; Bipolar Disorder





- Aftercare/Follow up Instructions


Aftercare/Follow-Up Instructions:: Counseled pt regarding lab results/diagnosis 

& need follow up, Counseled pt & family regarding lab results/diagnosis & need 

follow up





- Patient Disposition


Discharge/Transfer:: Acute Care w/in this hosp


Admitted to:: [facility]


Condition at Disposition:: Stable, Improved

## 2019-01-31 NOTE — HISTORY AND PHYSICAL
History of Present Illness





- HPI


Chief Complaint: 





agitation


HPI: 





This is a 58-year old male who is a snf resident admitted to [facility] due to x1 day 

history of agitation


Vital Signs: 





 Last Vital Signs











Temp  97.6 F   01/31/19 14:00


 


Pulse  75   01/31/19 14:00


 


Resp  20   01/31/19 14:00


 


BP  123/74   01/31/19 14:00


 


Pulse Ox  96   01/31/19 14:00














Past Medical History


Other History: 





HTN, Dyslipidemia, PUD/GERD, Seizures, Dementia





Family Medical History





- Family Member


  ** Mother


History Unknown: Yes





Social History


Smoke: No


Alcohol: None


Drugs: None


Lives: Nursing Home





- Medications


Home Medications: 


Home Medication











 Medication  Instructions  Recorded  Type


 


Citalopram Hydrobromide [celeXA] 20 mg PO DAILY  tab 01/03/19 Rx


 


Levetiracetam [Keppra] 1,000 mg PO BID  tab 01/03/19 Rx


 


Lorazepam [Ativan] 0.5 mg PO Q4HR PRN  tab 01/03/19 Rx


 


QUEtiapine Fumarate [SEROquel] 25 mg PO BID  tab 01/03/19 Rx


 


Zolpidem Tartrate [Ambien] 5 mg PO HS PRN  tab 01/03/19 Rx


 


Docusate Sodium [Colace] 100 mg PO DAILY 01/30/19 History














- Allergies


Allergies/Adverse Reactions: 


 Allergies











Allergy/AdvReac Type Severity Reaction Status Date / Time


 


No Known Allergies Allergy   Verified 12/18/18 15:26














Review of Systems





- Review of Systems


Constitutional: Report: No Significant


Eyes: Report: No Significant


ENT: Report: No Significant


Respiratory: Report: No Significant


Cardiovascular: Report: No Significant


Neurological: Report: No Significant





Physical Exam





- Physical Exam


HEENT: Report: Ears Nose Throat within normal limits


Neck: Report: Within normal limits


Cardiovascular Systems: Report: +s1/s2 noted, Regular, Rate and Rhythm


Respiratory: Report: Breath Sounds are within normal limits, Clear to 

Auscultation of lung fields


Abdomen: Report: Non-tender to palpation


Back: Report: Inspection of back is within normal limits.


Extremities: Report: Non-tender to palpation.


Skin: Report: Color of skin is within normal limits, Warm





- Lab Results


All Lab Results last 24 hours: 





 Laboratory Results - last 24 hr











  01/30/19 01/30/19





  17:15 17:15


 


Salicylates  < 25.0 L  < 25.0 L














- Assessment


Assessment: 





HTN, Dyslipidemia, agitation, GERD, Seizures, Dementia





- Plan


Plan: 





sz precautions


fall precautions


continue current rx

## 2019-02-01 ENCOUNTER — HOSPITAL ENCOUNTER (INPATIENT)
Dept: HOSPITAL 36 - ICU | Age: 59
LOS: 6 days | Discharge: NURSING, REHAB OR LONG-TERM CARE | DRG: 101 | End: 2019-02-07
Payer: MEDICARE

## 2019-02-01 VITALS — SYSTOLIC BLOOD PRESSURE: 130 MMHG | DIASTOLIC BLOOD PRESSURE: 69 MMHG

## 2019-02-01 DIAGNOSIS — N39.0: ICD-10-CM

## 2019-02-01 DIAGNOSIS — I10: ICD-10-CM

## 2019-02-01 DIAGNOSIS — R45.1: ICD-10-CM

## 2019-02-01 DIAGNOSIS — K21.9: ICD-10-CM

## 2019-02-01 DIAGNOSIS — F41.9: ICD-10-CM

## 2019-02-01 DIAGNOSIS — F29: ICD-10-CM

## 2019-02-01 DIAGNOSIS — F03.90: ICD-10-CM

## 2019-02-01 DIAGNOSIS — Z87.440: ICD-10-CM

## 2019-02-01 DIAGNOSIS — F32.9: ICD-10-CM

## 2019-02-01 DIAGNOSIS — G40.409: Primary | ICD-10-CM

## 2019-02-01 DIAGNOSIS — E78.5: ICD-10-CM

## 2019-02-01 DIAGNOSIS — F25.9: ICD-10-CM

## 2019-02-01 DIAGNOSIS — Z82.49: ICD-10-CM

## 2019-02-01 LAB
ALBUMIN SERPL-MCNC: 4.5 GM/DL (ref 4.2–5.5)
ALBUMIN/GLOB SERPL: 1.7 {RATIO} (ref 1–1.8)
ALP SERPL-CCNC: 83 U/L (ref 34–104)
ALT SERPL-CCNC: 45 U/L (ref 7–52)
ANION GAP SERPL CALC-SCNC: 25.6 MMOL/L (ref 7–16)
AST SERPL-CCNC: 35 U/L (ref 13–39)
BASOPHILS NFR BLD: 0 % (ref 0–3)
BASOPHILS NFR BLD: 0 % (ref 0–3)
BILIRUB SERPL-MCNC: 0.7 MG/DL (ref 0.3–1)
BUN SERPL-MCNC: 21 MG/DL (ref 7–25)
CALCIUM SERPL-MCNC: 9.2 MG/DL (ref 8.6–10.3)
CHLORIDE SERPL-SCNC: 102 MEQ/L (ref 98–107)
CO2 SERPL-SCNC: 16.9 MEQ/L (ref 21–31)
CREAT SERPL-MCNC: 0.9 MG/DL (ref 0.7–1.3)
EOSINOPHIL NFR BLD: 0 % (ref 0–5)
EOSINOPHIL NFR BLD: 1 % (ref 0–5)
ERYTHROCYTE [DISTWIDTH] IN BLOOD BY AUTOMATED COUNT: 13.1 % (ref 11.5–20)
ERYTHROCYTE [DISTWIDTH] IN BLOOD BY AUTOMATED COUNT: 13.2 % (ref 11.5–20)
GLOBULIN SER-MCNC: 2.7 GM/DL
GLUCOSE SERPL-MCNC: 172 MG/DL (ref 70–105)
HCT VFR BLD CALC: 40.3 % (ref 41–60)
HCT VFR BLD CALC: 42.4 % (ref 41–60)
HGB BLD-MCNC: 13.7 GM/DL (ref 12–16)
HGB BLD-MCNC: 14.3 GM/DL (ref 12–16)
LYMPHOCYTES # BLD MANUAL: 2 % (ref 20–50)
LYMPHOCYTES # BLD MANUAL: 4 % (ref 20–50)
MCH RBC QN AUTO: 32.2 PG (ref 26–30)
MCH RBC QN AUTO: 32.3 PG (ref 26–30)
MCHC RBC AUTO-ENTMCNC: 33.9 PG (ref 28–36)
MCHC RBC AUTO-ENTMCNC: 34 PG (ref 28–36)
MCV RBC AUTO: 94.7 FL (ref 80–99)
MCV RBC AUTO: 95.5 FL (ref 80–99)
MONOCYTES # BLD MANUAL: 5 % (ref 2–10)
MONOCYTES # BLD MANUAL: 5 % (ref 2–10)
NEUTROPHILS NFR BLD AUTO: 90 % (ref 40–80)
NEUTROPHILS NFR BLD AUTO: 91 % (ref 40–80)
NEUTS BAND NFR BLD: 0 % (ref 0–10)
NEUTS BAND NFR BLD: 2 % (ref 0–10)
PLATELET # BLD: 195 TH/CMM (ref 150–400)
PLATELET # BLD: 233 TH/CMM (ref 150–400)
PMV BLD AUTO: 7.9 FL
PMV BLD AUTO: 8.9 FL
POTASSIUM SERPL-SCNC: 3.5 MEQ/L (ref 3.5–5.1)
RBC # BLD AUTO: 4.25 MIL/CMM (ref 4.3–5.7)
RBC # BLD AUTO: 4.44 MIL/CMM (ref 4.3–5.7)
SODIUM SERPL-SCNC: 141 MEQ/L (ref 136–145)
WBC # BLD AUTO: 14.4 TH/CMM (ref 4.8–10.8)
WBC # BLD AUTO: 16.8 TH/CMM (ref 4.8–10.8)

## 2019-02-01 PROCEDURE — Z7610: HCPCS

## 2019-02-01 NOTE — DIAGNOSTIC IMAGING REPORT
CHEST X-RAY: AP view



INDICATION: Pneumonia



COMPARISON: 12/18/2018



FINDINGS: There is no focal consolidation or pleural effusions The heart

is normal in size. Tortuous aorta is noted.



IMPRESSION: 



No focal airspace consolidation identified.

## 2019-02-01 NOTE — PSYCHIATRIC EVALUATION
DATE OF SERVICE:  01/30/2019



PSYCHIATRIC EVALUATION AND MENTAL STATUS EXAMINATION



IDENTIFYING DATA:  The patient is a 58-year-old resident of [facility] in [place].  Information obtained by directly interviewing the

patient as well as reviewing the admission papers and they are reliable.



JUSTIFICATION OF HOSPITALIZATION:  The patient is admitted on a voluntary basis

in view of his acute agitation and psychosis.



CHIEF COMPLAINT:  The patient is not presenting with any verbalizations and has

been walking away from me.



HISTORY OF PRESENT ILLNESS:  This is the second psychiatric hospitalization to

this hospital for this patient who was hospitalized under my care on 12/18/2018.

 The patient has been stabilized and was placed at [facility] in [place].  However, the patient is reported to have been actively

responding to internal stimuli and has been pacing most of the time and getting

easily agitated and hence the patient has to be admitted over here for

stabilization.  Sleep and appetite prior to the hospitalization are reported to

be poor.  Coping skills are also noted to be very poor.  The patient is actively

responding to internal stimuli.



PAST PSYCHIATRIC HISTORY:  Please refer to the above.



MEDICAL HISTORY:  Physical examination is requested to be done by Dr. Quintanilla.



SUBSTANCE ABUSE HISTORY:  None.



PHYSICAL OR SEXUAL ABUSE HISTORY:  None.



LEGAL PROBLEMS:  None at this time.



STRENGTH AND ASSETS:  The patient is motivated.



MENTAL STATUS EXAMINATION:  The patient's behavior is a danger to herself

because the patient is running with no regard to his safety.  The patient's

short and long-term memory are noted to be impaired.  The patient is actively

responding to internal stimuli and the patient's behavior is a clear danger to

self at this time.  The patient is alert and awake, but the patient is not able

to participate in the interview and provide a decent information.  The patient

is grossly psychotic at this time.



DIAGNOSTIC IMPRESSION:

AXIS I:  Schizoaffective disorder.

AXIS II:  None.

AXIS III:  As per Dr. Quintanilla.



IMMEDIATE TREATMENT PLAN:  The patient is going to be observed on the inpatient

unit, provided with supportive psychotherapy.  The patient is going to be

closely monitored.  Once stabilized, the patient is going to be discharged to

[facility] to be followed up on an outpatient basis.  In view of the psychosis, the

patient is going to be started with Seroquel 25 mg twice a day and Celexa is

going to be given 20 mg in the morning and the patient is going to be followed

up with the supportive therapy.





DD: 01/31/2019 14:19

DT: 02/01/2019 01:24

[facility]# [id number]  [id number]

## 2019-02-01 NOTE — HISTORY & PHYSICAL
ADMIT DATE:  02/01/2019



CHIEF COMPLAINT:  Seizure episode.



HISTORY OF PRESENT ILLNESS:  The patient is a 58-year-old male with a past

medical history of depression, seizure disorder, hypertension, dementia,

admitted to Geropsych Unit on 01/30/2019 for Psychosis.  The patient was very

agitated at nursing facility, so he brought to the ER and got admitted to

Geropsych Unit, seen in mental health.  This morning, the patient had a seizure

episode and RRT was called.  The patient was transferred to the ICU for further

evaluation and monitoring.  Since his transfer to ICU, no weakness, seizure

noted.



PAST MEDICAL HISTORY:  Includes depression, anxiety disorder, psychosis,

hypertension, seizure disorder, and dementia.



FAMILY HISTORY:  Hypertension.



SOCIAL HISTORY:  The patient lives at care facility.  He is single.  Denies any

alcohol or drug use or smoking.



PAST SURGICAL HISTORY:  None.



PSYCHIATRIC HISTORY:  Dementia, anxiety disorder, psychosis.



MEDICATIONS:  As per medication reconciliation sheet.



CURRENT MEDICATIONS:  Include Tylenol 650 mg p.o. q. 4 hourly p.r.n., Maalox 30

mL p.o. q.4 hourly p.r.n. for GI distress, Celexa 20 mg p.o. daily for

depression, Colace 100 mg p.o. daily for constipation, Keppra 1 mg p.o. twice a

day for seizures, lorazepam for anxiety and sleep, milk of magnesia 30 mL p.o.

at bedtime p.r.n. for constipation, multivitamin 1 tab p.o. daily, Seroquel 25

mg p.o. twice a day.  and at bedtime.  Ambien 5 mg p.o. once daily at bedtime

p.r.n. for insomnia, Ambien.  During the RRT.



ALLERGIES:  NKDA.



REVIEW OF SYSTEMS:

GENERAL:  The patient _____ to sleep, but as per record, the patient has no

fever, no chills.

HEENT:  No diplopia, no photophobia, no sore throat.

RESPIRATORY:  No cough, no shortness of breath.

CARDIOVASCULAR:  No chest pain or palpitation.

GASTROINTESTINAL:  No nausea, no vomiting, no diarrhea.

GENITOURINARY:  No dysuria.

NEUROLOGIC:  No headache, no dizziness, no focal weakness.

PSYCHIATRIC:  The patient was agitated before, but now calm and sleeping.



PHYSICAL EXAMINATION:

VITAL SIGNS:  Shows temperature is 99.7 degrees Fahrenheit, pulse 67,

respirations 16, blood pressure 145/78, oxygen saturation 98%.

GENERAL:  The patient is comfortable, lying in the bed, not in acute distress.

HEENT:  Head is normocephalic, atraumatic.  Oral cavity moist, pink tongue.

NECK:  Supple, no JVD, no bruit.  Trachea in midline.

CHEST:  Bilateral breath sounds.  No crackles or wheezing.

HEART:  S1, S2 within normal limits.  Regular rhythm.  No murmur, no gallop.

ABDOMEN:  Soft, nontender, nondistended.  Bowel sounds present.

EXTREMITIES:  No cyanosis, no clubbing, no edema.

NEUROLOGICAL:  Sleeping able to open his eyes.



LABORATORY DATA:  Current lab [place] [address], hemoglobin 14.3,

hematocrit 42.4, platelets are 233,000, neutrophils 55.6%.  INR is 1.0.  Sodium

is 141, potassium 3.5, chloride 102, bicarbonate is 16.9, BUN is 21, creatinine

0.9, glucose is 172.  Lactic acid 2.64, it was 11.13.  Calcium 9.2, total

bilirubin 0.7, AST 35, ALT is 245, alkaline phosphatase 83.  Ammonia 35.



IMPRESSION:

1.  Most likely a breakthrough seizures.

2.  Hypertension.

3.  Psychosis, depression.

4.  Dementia.

5.  Agitation.



PLAN AND RECOMMENDATIONS:  We will get the CT scan of the brain with and without

contrast.  Meanwhile, check the Keppra level and give Ativan 2 mg IV q.4 hourly

p.r.n. for seizures.  Continue his home medication.  Consultants will call Dr. Kincaid for psychiatrist and Neurology consultant Dr. Thurston is out of town,

so withhold a Neurology consult at this moment.





DD: 02/01/2019 14:19

DT: 02/01/2019 15:34

JOB# [id number]  [id number]

## 2019-02-02 NOTE — CONSULTATION
DATE OF CONSULTATION:     02/02/2019



REFERRING PHYSICIAN:  Blaze Kincaid M.D.



TYPE OF CONSULTATION:  Psychology.



HISTORY OF PRESENT ILLNESS:  The patient is a 58-year-old  male.  The

patient is a resident of [facility] in [place].  The patient

originally was admitted to the geropsychiatric unit.  The patient was 
transferred

to the ICU due to a seizure episode.  The patient is being seen in the ICU.  The

patient was originally admitted due to acute agitation and psychosis.  The

following is by review of the medical record and by patient's self-report.  The

patient presents as reluctant to respond to the clinical interview questions. 

The patient was seen by this writer from previous hospitalization in December. 

The patient appears to be responding to internal stimuli.  The patient denied

any suicidal ideation, plan or intention.  The patient only response was that he

was concerned about his medical condition.



PAST MEDICAL HISTORY:  Please see history and physical by Dr. Quintanilla and

consultation by Dr. Saritha Kelly.



PAST PSYCHIATRIC HISTORY:  The patient has a history of schizoaffective disorder

and is seen by both a psychiatrist and psychologist at his placement.  The 
patient

has one previous hospitalization.



SUBSTANCE ABUSE HISTORY:  The patient did not answer these questions.



PSYCHOSOCIAL HISTORY:  The patient did not answer questions about occupational

or educational history or Orthodoxy affiliation.  The patient did not answer

questions about family relationships or others involved in his care.  The

patient did not answer questions about history of physical or sexual abuse or

current legal problems.



MENTAL STATUS EXAMINATION:  The patient appears to be his stated age.  The

patient's attitude is guarded.  Eye contact is poor.  Speech is delayed and the

patient is mumbling.  The patient's mood is irritable.  Affect is constricted. 

Thought process shows the patient is most likely responding to internal stimuli.

The patient did not answer questions about suicidal ideation, plan or

intention.  The patient did not answer questions about experiencing auditory or

visual hallucinations.  It is noted the patient is experiencing paranoid

ideation.  The patient's behavior is compliant with the care being provided here

in the ICU.  Impulse control is limited.  Concentration is poor.  Sensorium is

alert and oriented to self and place only.  The patient did not participate in

the memory assessment.  The patient did not participate in the interpretation of

proverbs.  The patient was not providing much information and is not making much

sense.  Insight is impaired.  Judgment is impaired.



DIAGNOSTIC IMPRESSION:

AXIS I:  History of schizoaffective disorder.

AXIS II:  Deferred.

AXIS III:  Per Dr. Quintanilla and Dr. KIMBERLEE Kelly.



TREATMENT PLAN:  The patient was seen by Dr. Kincaid for psychiatric

evaluation and for the management of the patient's psychotropic medications. 

The patient was started on Seroquel 25 mg twice a day and Celexa 20 mg in the

morning according to the attending psychiatrist's treatment protocol.  We will

provide reality orientation, differentiation and integration.  We will provide

motivational enhancement for the patient to become compliant and stay compliant

with all aspects of his care and treatment.  We will provide behavioral

redirection and encourage the patient to demonstrate emotional and

self-regulation.  We will encourage the patient to verbalize his concerns.  We

will provide coping strategies for chronic severe mental illness.  The patient

will be followed up in 2-3 days if the patient is transferred from the ICU to

the Geropsychiatric Unit.



Thank you, Dr. Kincaid and Dr. Kelly, for this consult and the opportunity to

participate in this patient's care.





DD: 02/02/2019 13:47

DT: 02/02/2019 14:14

JOB# [id number]  [id number]

[email or web address]

## 2019-02-02 NOTE — PROGRESS NOTES
DATE:  02/02/2019



PSYCHIATRIC PROGRESS NOTE



SUBJECTIVE:  Staff was spoken to.  The patient is interviewed.  Mood is noted to

be irritable.  Affect is constricted.  Insight and judgment are noted to be

still impaired.  The patient has been still psychotic.  The patient is currently

on Seroquel.  No side effects to the medications are noted.  Insight and

judgment at this time are noted to be still impaired.  Impulse control seems to

be fair.  The patient is currently on the Seroquel, which is going to be given

25 mg b.i.d. and the nighttime dose of the Seroquel is going to be discontinued

and the patient is going to be closely monitored for any of the seizures and

followed up with the supportive therapy.





DD: 02/02/2019 16:03

DT: 02/02/2019 16:49

JOB# [id number]  [id number]

## 2019-02-02 NOTE — GENERAL PROGRESS NOTE
Subjective





- Review of Systems


Service Date: 02/02/19


Subjective: 





Patient has no seizures less congestion lethargic





Objective





- Results


Result Diagrams: 


 02/01/19 14:17





Recent Labs: 


 Laboratory Last Values











WBC  14.4 Th/cmm (4.8-10.8)  H  02/01/19  14:17    


 


RBC  4.25 Mil/cmm (4.30-5.70)  L  02/01/19  14:17    


 


Hgb  13.7 gm/dL (12-16)   02/01/19  14:17    


 


Hct  40.3 % (41.0-60)  L  02/01/19  14:17    


 


MCV  94.7 fl (80-99)   02/01/19  14:17    


 


MCH  32.2 pg (26.0-30.0)  H  02/01/19  14:17    


 


MCHC Differential  34.0 pg (28.0-36.0)   02/01/19  14:17    


 


RDW  13.1 % (11.5-20.0)   02/01/19  14:17    


 


Plt Count  195 Th/cmm (150-400)   02/01/19  14:17    


 


MPV  7.9 fl  02/01/19  14:17    


 


Add Manual Diff  YES   02/01/19  14:17    


 


Band Neutrophils %  0 % (0-10)   02/01/19  14:17    


 


Neutrophils (Manual)  90 % (40-80)  H  02/01/19  14:17    


 


Lymphocytes  4 % (20-50)  L  02/01/19  14:17    


 


Monocytes  5 % (2-10)   02/01/19  14:17    


 


Eosinophils  1 % (0-5)   02/01/19  14:17    


 


Basophils  0 % (0-3)   02/01/19  14:17    














- Physical Exam


Vitals and I&O: 


 Vital Signs











Temp  98.6 F   02/02/19 12:56


 


Pulse  87   02/02/19 12:56


 


Resp  17   02/02/19 12:56


 


BP  134/81   02/02/19 12:56


 


Pulse Ox  98   02/02/19 12:56








 Intake & Output











 02/01/19 02/02/19 02/02/19





 18:59 06:59 18:59


 


Intake Total 250 527.5 


 


Balance 250 527.5 


 


Weight (lbs) 58.967 kg 58.967 kg 63.503 kg


 


Intake:   


 


  Intake, IV Amount  527.5 


 


    Sodium Chloride 0.9% 1,  527.5 





    000 ml @ 50 mls/hr IV .   





    Q20H [facility] Rx#:[id number]   


 


  Oral 250  


 


Other:   


 


  # Voids 3 1 


 


  # Bowel Movements 1 1 


 


  Stool Characteristics Formed Soft Soft





 Brown Brown Brown


 


  Weight Source Bedscale Bedscale Bedscale











Active Medications: 


Current Medications





Acetaminophen (Tylenol)  650 mg PO Q4HR PRN


   PRN Reason: Mild Pain / Temp above 100


   Stop: 04/02/19 13:56


Al Hydrox/Mg Hydrox/Simethicone (Maalox)  30 ml PO Q4HR PRN


   PRN Reason: GI DISTRESS


   Stop: 04/02/19 13:56


Citalopram Hydrobromide (Celexa)  20 mg PO DAILY [facility]; Protocol


   Stop: 04/03/19 08:59


   Last Admin: 02/02/19 09:22 Dose:  20 mg


Docusate Sodium (Colace)  100 mg PO DAILY [facility]


   Stop: 04/03/19 08:59


   Last Admin: 02/02/19 09:23 Dose:  100 mg


Sodium Chloride (Nacl 0.9%)  1,000 mls @ 50 mls/hr IV .Q20H [facility]


   Stop: 04/02/19 09:59


   Last Infusion: 02/02/19 06:32 Dose:  50 mls/hr


Levetiracetam (Keppra)  1,000 mg PO BID [facility]


   Stop: 04/02/19 16:59


   Last Admin: 02/02/19 09:23 Dose:  1,000 mg


Lorazepam (Ativan)  0.5 mg PO Q4HR PRN; Protocol


   PRN Reason: Anxiety


   Stop: 04/02/19 13:56


Lorazepam (Ativan)  2 mg IVP Q4HR PRN; Protocol


   PRN Reason: Seizures


   Stop: 04/02/19 14:06


Magnesium Hydroxide (Milk Of Magnesia)  30 ml PO HS PRN


   PRN Reason: Constipation


   Stop: 04/02/19 13:56


Multivitamins/Vitamin C (Theragran)  1 tab PO DAILY [facility]


   Stop: 04/03/19 08:59


   Last Admin: 02/02/19 09:26 Dose:  1 tab


Quetiapine Fumarate (Seroquel)  25 mg PO BID [facility]; Protocol


   Stop: 04/02/19 16:59


   Last Admin: 02/02/19 09:24 Dose:  25 mg


Quetiapine Fumarate (Seroquel)  25 mg PO HS [facility]; Protocol


   Stop: 04/02/19 20:59


Zolpidem Tartrate (Ambien)  5 mg PO HS PRN


   PRN Reason: Insomnia


   Stop: 04/02/19 13:56








General: No acute distress


HEENT: Other (normal)


Neck: Supple, JVD, +2 carotid pulse wo bruit


Cardiovascular: Regular rate, Normal S1, Normal S2, Systolic murmurs


Lungs: Clear to auscultation, Normal air movement


Abdomen: Soft


Extremities: Pulses (normal)





Assessment/Plan





- Assessment


Assessment: 





Grand mal seizures


Hyperlipidemia


GERD


Dementia


Urinary tract infection


Psychosis





- Plan


Plan: 





Continue patient on Tegretol will give Ativan IV as necessary

## 2019-02-02 NOTE — PROGRESS NOTES
DATE:  02/01/2019



SUBJECTIVE:  Staff was spoken to.  The patient is interviewed.  Mood is noted to

be irritable.  Affect is constricted.  The patient ended up developing seizure. 

The patient has to be transferred to the ICU.  The patient at this time

continues to be paranoid.  No side effects to the medications are noted.  The

patient is going to be closely monitored with the current medications and

followed up.





DD: 02/01/2019 23:28

DT: 02/02/2019 12:55

[facility]# [id number]  [id number]

## 2019-02-02 NOTE — CONSULTATION
DATE OF CONSULTATION:  02/01/2019



The patient of Dr. Alexander Kelly.



HISTORY OF PRESENT ILLNESS:  This 58-year-old male patient who was recently

admitted to [facility] for severe agitation.  The patient has a known history of

seizure disorder.  The patient has been seen by neurologist.  The patient is on

Keppra.  The patient in [facility], developed seizure activity.  Following this,

the patient is transferred to ICU.  At the present time, the patient is

lethargic, altered level, seizure has stopped.



PAST MEDICAL HISTORY:  Seizure disorder, hyperlipidemia, GERD, dementia, urinary

tract infection, and psychosis.



FAMILY HISTORY:  Unremarkable.



SOCIAL HISTORY:  No history of smoking, alcohol abuse.



ALLERGIES:  No known allergies.



PHYSICAL EXAMINATION:

VITAL SIGNS:  Blood pressure 130/80, pulse 70, respirations 20, temperature 98.

HEAD:  Normocephalic.  No lumps or bumps.

EYES:  Pupils equal, reactive to light.  Fundi show AV nicking, sclerae white,

conjunctivae pink.

NECK:  Carotid 2+.  Normal upstroke.  JVD flat.  Thyroid not palpable.  Lymph

nodes not palpable.

CHEST:  Shows increased AP diameter.  No kyphosis, scoliosis.

LUNGS:  Bilateral bronchovesicular breath sounds.

HEART:  PMI fifth intercostal space with lateral to midclavicular line.  S1, S2.

 No S3, S4, soft systolic murmur.

ABDOMEN:  Soft.  Liver, spleen not palpable.  No organomegaly.  Bowel sounds

active.

NEUROLOGIC:  Seizure disorder.



CLINICAL IMPRESSION:  Grand mal seizure, hyperlipidemia, gastroesophageal reflux

disease, dementia, urinary tract infection, and psychosis.



PLAN:  We will continue Keppra.  Monitor the patient closely in the intensive

care unit in view of recent seizures.





DD: 02/01/2019 15:18

DT: 02/02/2019 02:42

JOB# [id number]  [id number]

## 2019-02-03 LAB
ANION GAP SERPL CALC-SCNC: 10.5 MMOL/L (ref 7–16)
BASOPHILS # BLD AUTO: 0 TH/CUMM (ref 0–0.2)
BASOPHILS NFR BLD AUTO: 0.2 % (ref 0–2)
BUN SERPL-MCNC: 11 MG/DL (ref 7–25)
CALCIUM SERPL-MCNC: 8.7 MG/DL (ref 8.6–10.3)
CHLORIDE SERPL-SCNC: 104 MEQ/L (ref 98–107)
CO2 SERPL-SCNC: 28.1 MEQ/L (ref 21–31)
CREAT SERPL-MCNC: 0.6 MG/DL (ref 0.7–1.3)
EOSINOPHIL # BLD AUTO: 0 TH/CMM (ref 0.1–0.4)
EOSINOPHIL NFR BLD AUTO: 0.5 % (ref 0–5)
ERYTHROCYTE [DISTWIDTH] IN BLOOD BY AUTOMATED COUNT: 12.8 % (ref 11.5–20)
GLUCOSE SERPL-MCNC: 116 MG/DL (ref 70–105)
HCT VFR BLD CALC: 37.7 % (ref 41–60)
HGB BLD-MCNC: 12.5 GM/DL (ref 12–16)
LYMPHOCYTE AB SER FC-ACNC: 0.9 TH/CMM (ref 1.5–3)
LYMPHOCYTES NFR BLD AUTO: 10.3 % (ref 20–50)
MCH RBC QN AUTO: 31.7 PG (ref 26–30)
MCHC RBC AUTO-ENTMCNC: 33.3 PG (ref 28–36)
MCV RBC AUTO: 95.3 FL (ref 80–99)
MONOCYTES # BLD AUTO: 0.8 TH/CMM (ref 0.3–1)
MONOCYTES NFR BLD AUTO: 9.2 % (ref 2–10)
NEUTROPHILS # BLD: 7.3 TH/CMM (ref 1.8–8)
NEUTROPHILS NFR BLD AUTO: 79.8 % (ref 40–80)
PLATELET # BLD: 168 TH/CMM (ref 150–400)
PMV BLD AUTO: 8.8 FL
POTASSIUM SERPL-SCNC: 3.6 MEQ/L (ref 3.5–5.1)
RBC # BLD AUTO: 3.95 MIL/CMM (ref 4.3–5.7)
SODIUM SERPL-SCNC: 139 MEQ/L (ref 136–145)
WBC # BLD AUTO: 9 TH/CMM (ref 4.8–10.8)

## 2019-02-03 NOTE — GENERAL PROGRESS NOTE
Subjective





- Review of Systems


Service Date: 02/03/19


Subjective: 





Patient has no seizures less congestion lethargic





Objective





- Results


Result Diagrams: 


 02/03/19 05:56





 02/03/19 05:56


Recent Labs: 


 Laboratory Last Values











WBC  9.0 Th/cmm (4.8-10.8)   02/03/19  05:56    


 


RBC  3.95 Mil/cmm (4.30-5.70)  L  02/03/19  05:56    


 


Hgb  12.5 gm/dL (12-16)   02/03/19  05:56    


 


Hct  37.7 % (41.0-60)  L  02/03/19  05:56    


 


MCV  95.3 fl (80-99)   02/03/19  05:56    


 


MCH  31.7 pg (26.0-30.0)  H  02/03/19  05:56    


 


MCHC Differential  33.3 pg (28.0-36.0)   02/03/19  05:56    


 


RDW  12.8 % (11.5-20.0)   02/03/19  05:56    


 


Plt Count  168 Th/cmm (150-400)   02/03/19  05:56    


 


MPV  8.8 fl  02/03/19  05:56    


 


Add Manual Diff  YES   02/01/19  14:17    


 


Neutrophils %  79.8 % (40.0-80.0)   02/03/19  05:56    


 


Band Neutrophils %  0 % (0-10)   02/01/19  14:17    


 


Lymphocytes %  10.3 % (20.0-50.0)  L  02/03/19  05:56    


 


Monocytes %  9.2 % (2.0-10.0)   02/03/19  05:56    


 


Eosinophils %  0.5 % (0.0-5.0)   02/03/19  05:56    


 


Basophils %  0.2 % (0.0-2.0)   02/03/19  05:56    


 


Neutrophils (Manual)  90 % (40-80)  H  02/01/19  14:17    


 


Lymphocytes  4 % (20-50)  L  02/01/19  14:17    


 


Monocytes  5 % (2-10)   02/01/19  14:17    


 


Eosinophils  1 % (0-5)   02/01/19  14:17    


 


Basophils  0 % (0-3)   02/01/19  14:17    


 


Sodium  139 mEq/L (136-145)   02/03/19  05:56    


 


Potassium  3.6 mEq/L (3.5-5.1)   02/03/19  05:56    


 


Chloride  104 mEq/L ()   02/03/19  05:56    


 


Carbon Dioxide  28.1 mEq/L (21.0-31.0)   02/03/19  05:56    


 


Anion Gap  10.5  (7.0-16.0)   02/03/19  05:56    


 


BUN  11 mg/dL (7-25)   02/03/19  05:56    


 


Creatinine  0.6 mg/dL (0.7-1.3)  L  02/03/19  05:56    


 


Est GFR ( Amer)  > 60.0 ml/min (>90)   02/03/19  05:56    


 


Est GFR (Non-Af Amer)  > 60.0 ml/min  02/03/19  05:56    


 


BUN/Creatinine Ratio  18.3   02/03/19  05:56    


 


Glucose  116 mg/dL ()  H  02/03/19  05:56    


 


Calcium  8.7 mg/dL (8.6-10.3)   02/03/19  05:56    














- Physical Exam


Vitals and I&O: 


 Vital Signs











Temp  98.2 F   02/03/19 08:00


 


Pulse  63   02/03/19 08:00


 


Resp  18   02/03/19 10:00


 


BP  122/82   02/03/19 08:00


 


Pulse Ox  95   02/03/19 08:00








 Intake & Output











 02/02/19 02/03/19 02/03/19





 18:59 06:59 18:59


 


Intake Total 1292.5 200 


 


Output Total 450  


 


Balance 842.5 200 


 


Weight (lbs) 63.503 kg 63.503 kg 


 


Intake:   


 


  Intake, IV Amount 472.5  


 


    Sodium Chloride 0.9% 1, 472.5  





    000 ml @ 50 mls/hr IV .   





    Q20H [facility] Rx#:[id number]   


 


  Oral 820 200 


 


Output:   


 


  Urine 450  


 


Other:   


 


  # Voids 2 2 


 


  # Bowel Movements 0 0 


 


  Stool Characteristics Soft  





 Brown  


 


  Weight Source Bedscale Bedscale 











Active Medications: 


Current Medications





Acetaminophen (Tylenol)  650 mg PO Q4HR PRN


   PRN Reason: Mild Pain / Temp above 100


   Stop: 04/02/19 13:56


   Last Admin: 02/02/19 16:11 Dose:  650 mg


Al Hydrox/Mg Hydrox/Simethicone (Maalox)  30 ml PO Q4HR PRN


   PRN Reason: GI DISTRESS


   Stop: 04/02/19 13:56


Citalopram Hydrobromide (Celexa)  20 mg PO DAILY [facility]; Protocol


   Stop: 04/03/19 08:59


   Last Admin: 02/03/19 08:24 Dose:  20 mg


Docusate Sodium (Colace)  100 mg PO DAILY [facility]


   Stop: 04/03/19 08:59


   Last Admin: 02/03/19 08:24 Dose:  100 mg


Sodium Chloride (Nacl 0.9%)  1,000 mls @ 50 mls/hr IV .Q20H [facility]


   Stop: 04/02/19 09:59


   Last Admin: 02/02/19 16:01 Dose:  50 mls/hr


Levetiracetam (Keppra)  1,000 mg PO BID [facility]


   Stop: 04/02/19 16:59


   Last Admin: 02/03/19 08:24 Dose:  1,000 mg


Lorazepam (Ativan)  0.5 mg PO Q4HR PRN; Protocol


   PRN Reason: Anxiety


   Stop: 04/02/19 13:56


Lorazepam (Ativan)  2 mg IVP Q4HR PRN; Protocol


   PRN Reason: Seizures


   Stop: 04/02/19 14:06


Magnesium Hydroxide (Milk Of Magnesia)  30 ml PO HS PRN


   PRN Reason: Constipation


   Stop: 04/02/19 13:56


Multivitamins/Vitamin C (Theragran)  1 tab PO DAILY [facility]


   Stop: 04/03/19 08:59


   Last Admin: 02/03/19 08:24 Dose:  1 tab


Quetiapine Fumarate (Seroquel)  25 mg PO BID [facility]; Protocol


   Stop: 04/02/19 16:59


   Last Admin: 02/03/19 08:24 Dose:  25 mg


Zolpidem Tartrate (Ambien)  5 mg PO HS PRN


   PRN Reason: Insomnia


   Stop: 04/02/19 13:56








General: No acute distress


HEENT: Other (normal)


Neck: Supple, JVD, +2 carotid pulse wo bruit


Cardiovascular: Regular rate, Normal S1, Normal S2, Systolic murmurs


Lungs: Clear to auscultation, Normal air movement


Abdomen: Soft


Extremities: Pulses (normal)





Assessment/Plan





- Assessment


Assessment: 





Grand mal seizures


Hyperlipidemia


GERD


Dementia


Urinary tract infection


Psychosis





- Plan


Plan: 





Continue patient on Tegretol will give Ativan IV as necessary

## 2019-02-03 NOTE — PROGRESS NOTES
DATE:  02/03/2019



PSYCHIATRIC PROGRESS NOTE



PROGRESS ON THE UNIT:  Staff was spoken to.  The patient is interviewed.  Mood

is noted to be less irritable.  Affect is appropriate.  The patient has been

closely monitored for any seizures.  The patient is currently on Seroquel 25 mg

b.i.d. and is able to tolerate the medications.  No major behavioral problems

are noted today.



PLAN:  To continue the patient with supportive therapy.  Encouraged the patient

to verbalize the concerns rather than to act out.





DD: 02/03/2019 11:42

DT: 02/03/2019 17:24

JOB# [id number]  [id number]

## 2019-02-03 NOTE — INFECTIOUS DISEASE PROG NOTE
Infectious Disease Subjective





- Review of Systems


Service Date: 02/02/19


Subjective: 





There is no new change, no fever.No seizure episode.





Infectious Disease Objective





- Results


Result Diagrams: 


 02/01/19 14:17





Recent Labs: 


 Laboratory Last Values











WBC  14.4 Th/cmm (4.8-10.8)  H  02/01/19  14:17    


 


RBC  4.25 Mil/cmm (4.30-5.70)  L  02/01/19  14:17    


 


Hgb  13.7 gm/dL (12-16)   02/01/19  14:17    


 


Hct  40.3 % (41.0-60)  L  02/01/19  14:17    


 


MCV  94.7 fl (80-99)   02/01/19  14:17    


 


MCH  32.2 pg (26.0-30.0)  H  02/01/19  14:17    


 


MCHC Differential  34.0 pg (28.0-36.0)   02/01/19  14:17    


 


RDW  13.1 % (11.5-20.0)   02/01/19  14:17    


 


Plt Count  195 Th/cmm (150-400)   02/01/19  14:17    


 


MPV  7.9 fl  02/01/19  14:17    


 


Add Manual Diff  YES   02/01/19  14:17    


 


Band Neutrophils %  0 % (0-10)   02/01/19  14:17    


 


Neutrophils (Manual)  90 % (40-80)  H  02/01/19  14:17    


 


Lymphocytes  4 % (20-50)  L  02/01/19  14:17    


 


Monocytes  5 % (2-10)   02/01/19  14:17    


 


Eosinophils  1 % (0-5)   02/01/19  14:17    


 


Basophils  0 % (0-3)   02/01/19  14:17    














- Physical Exam


Vitals and I&O: 


 Vital Signs











Temp  99 F   02/03/19 00:00


 


Pulse  72   02/03/19 00:00


 


Resp  18   02/03/19 00:00


 


BP  133/94   02/03/19 00:00


 


Pulse Ox  97   02/03/19 00:00








 Intake & Output











 02/02/19 02/02/19 02/03/19





 06:59 18:59 06:59


 


Intake Total 527.5 1292.5 


 


Output Total  450 


 


Balance 527.5 842.5 


 


Weight (lbs) 58.967 kg 63.503 kg 


 


Intake:   


 


  Intake, IV Amount 527.5 472.5 


 


    Sodium Chloride 0.9% 1, 527.5 472.5 





    000 ml @ 50 mls/hr IV .   





    [facility]H [facility] Rx#:[id number]   


 


  Oral  820 


 


Output:   


 


  Urine  450 


 


Other:   


 


  # Voids 1 2 


 


  # Bowel Movements 1 0 


 


  Stool Characteristics Soft Soft 





 Brown Brown 


 


  Weight Source Bedscale Bedscale 











Active Medications: 


Current Medications





Acetaminophen (Tylenol)  650 mg PO Q4HR PRN


   PRN Reason: Mild Pain / Temp above 100


   Stop: 04/02/19 13:56


   Last Admin: 02/02/19 16:11 Dose:  650 mg


Al Hydrox/Mg Hydrox/Simethicone (Maalox)  30 ml PO Q4HR PRN


   PRN Reason: GI DISTRESS


   Stop: 04/02/19 13:56


Citalopram Hydrobromide (Celexa)  20 mg PO DAILY [facility]; Protocol


   Stop: 04/03/19 08:59


   Last Admin: 02/02/19 09:22 Dose:  20 mg


Docusate Sodium (Colace)  100 mg PO [facility]ILY [facility]


   Stop: 04/03/19 08:59


   Last Admin: 02/02/19 09:23 Dose:  100 mg


Sodium Chloride (Nacl 0.9%)  1,000 mls @ 50 mls/hr IV .[facility]H [facility]


   Stop: 04/02/19 09:59


   Last Admin: 02/02/19 16:01 Dose:  50 mls/hr


Levetiracetam (Keppra)  1,000 mg PO BID [facility]


   Stop: 04/02/19 16:59


   Last Admin: 02/02/19 16:10 Dose:  1,000 mg


Lorazepam (Ativan)  0.5 mg PO Q4HR PRN; Protocol


   PRN Reason: Anxiety


   Stop: 04/02/19 13:56


Lorazepam (Ativan)  2 mg IVP Q4HR PRN; Protocol


   PRN Reason: Seizures


   Stop: 04/02/19 14:06


Magnesium Hydroxide (Milk Of Magnesia)  30 ml PO HS PRN


   PRN Reason: Constipation


   Stop: 04/02/19 13:56


Multivitamins/Vitamin C (Theragran)  1 tab PO DAILY [facility]


   Stop: 04/03/19 08:59


   Last Admin: 02/02/19 09:26 Dose:  1 tab


Quetiapine Fumarate (Seroquel)  25 mg PO BID [facility]; Protocol


   Stop: 04/02/19 16:59


   Last Admin: 02/02/19 16:11 Dose:  25 mg


Zolpidem Tartrate (Ambien)  5 mg PO HS PRN


   PRN Reason: Insomnia


   Stop: 04/02/19 13:56








General: no acute distress, well developed, well nourished


HEENT: atraumatic, normocephalic, PERRLA, EOMI


Neck: supple, no thyromegaly


Cardiovascular: S1S2, regular


Lungs: clear to auscultation bilaterally, clear to percussion


Abdomen: soft, no tender, no distended


Extremities: no cyanosis, no clubbing, no edema


Neurological: awake, alert





Infectious Disease Assmt/Plan





- Assessment


Assessment: 





1.  Most likely a breakthrough seizures.


2.  Hypertension.


3.  Psychosis, depression.


4.  Dementia.


5.  Agitation.





- Plan


Plan: 


CT head neg


CPM.


EEG.

## 2019-02-04 NOTE — GENERAL PROGRESS NOTE
Subjective





- Review of Systems


Service Date: 02/04/19


Subjective: 





Patient has no seizures less congestion lethargic





Objective





- Results


Result Diagrams: 


 02/03/19 05:56





 02/03/19 05:56


Recent Labs: 


 Laboratory Last Values











WBC  9.0 Th/cmm (4.8-10.8)   02/03/19  05:56    


 


RBC  3.95 Mil/cmm (4.30-5.70)  L  02/03/19  05:56    


 


Hgb  12.5 gm/dL (12-16)   02/03/19  05:56    


 


Hct  37.7 % (41.0-60)  L  02/03/19  05:56    


 


MCV  95.3 fl (80-99)   02/03/19  05:56    


 


MCH  31.7 pg (26.0-30.0)  H  02/03/19  05:56    


 


MCHC Differential  33.3 pg (28.0-36.0)   02/03/19  05:56    


 


RDW  12.8 % (11.5-20.0)   02/03/19  05:56    


 


Plt Count  168 Th/cmm (150-400)   02/03/19  05:56    


 


MPV  8.8 fl  02/03/19  05:56    


 


Add Manual Diff  YES   02/01/19  14:17    


 


Neutrophils %  79.8 % (40.0-80.0)   02/03/19  05:56    


 


Band Neutrophils %  0 % (0-10)   02/01/19  14:17    


 


Lymphocytes %  10.3 % (20.0-50.0)  L  02/03/19  05:56    


 


Monocytes %  9.2 % (2.0-10.0)   02/03/19  05:56    


 


Eosinophils %  0.5 % (0.0-5.0)   02/03/19  05:56    


 


Basophils %  0.2 % (0.0-2.0)   02/03/19  05:56    


 


Neutrophils (Manual)  90 % (40-80)  H  02/01/19  14:17    


 


Lymphocytes  4 % (20-50)  L  02/01/19  14:17    


 


Monocytes  5 % (2-10)   02/01/19  14:17    


 


Eosinophils  1 % (0-5)   02/01/19  14:17    


 


Basophils  0 % (0-3)   02/01/19  14:17    


 


Sodium  139 mEq/L (136-145)   02/03/19  05:56    


 


Potassium  3.6 mEq/L (3.5-5.1)   02/03/19  05:56    


 


Chloride  104 mEq/L ()   02/03/19  05:56    


 


Carbon Dioxide  28.1 mEq/L (21.0-31.0)   02/03/19  05:56    


 


Anion Gap  10.5  (7.0-16.0)   02/03/19  05:56    


 


BUN  11 mg/dL (7-25)   02/03/19  05:56    


 


Creatinine  0.6 mg/dL (0.7-1.3)  L  02/03/19  05:56    


 


Est GFR ( Amer)  > 60.0 ml/min (>90)   02/03/19  05:56    


 


Est GFR (Non-Af Amer)  > 60.0 ml/min  02/03/19  05:56    


 


BUN/Creatinine Ratio  18.3   02/03/19  05:56    


 


Glucose  116 mg/dL ()  H  02/03/19  05:56    


 


Calcium  8.7 mg/dL (8.6-10.3)   02/03/19  05:56    














- Physical Exam


Vitals and I&O: 


 Vital Signs











Temp  98.3 F   02/04/19 07:43


 


Pulse  64   02/04/19 07:43


 


Resp  18   02/04/19 10:00


 


BP  130/80   02/04/19 07:43


 


Pulse Ox  98   02/04/19 07:43








 Intake & Output











 02/03/19 02/04/19 02/04/19





 18:59 06:59 18:59


 


Intake Total 1600 200 957.5


 


Output Total  500 


 


Balance 1600 -300 957.5


 


Weight (lbs) 63.503 kg 63.503 kg 


 


Intake:   


 


  Intake, IV Amount 1000  957.5


 


    Sodium Chloride 0.9% 1, 1000  957.5





    000 ml @ 50 mls/hr IV .   





    Q20H JESSICA Rx#:[id number]   


 


  Oral 600 200 


 


Output:   


 


  Urine  500 


 


Other:   


 


  # Voids 3 0 


 


  # Bowel Movements 0 0 


 


  Weight Source Bedscale Bedscale 











Active Medications: 


Current Medications





Acetaminophen (Tylenol)  650 mg PO Q4HR PRN


   PRN Reason: Mild Pain / Temp above 100


   Stop: 04/02/19 13:56


   Last Admin: 02/03/19 16:05 Dose:  650 mg


Al Hydrox/Mg Hydrox/Simethicone (Maalox)  30 ml PO Q4HR PRN


   PRN Reason: GI DISTRESS


   Stop: 04/02/19 13:56


Citalopram Hydrobromide (Celexa)  20 mg PO DAILY [facility]; Protocol


   Stop: 04/03/19 08:59


   Last Admin: 02/04/19 09:33 Dose:  20 mg


Docusate Sodium (Colace)  100 mg PO DAILY [facility]


   Stop: 04/03/19 08:59


   Last Admin: 02/04/19 09:33 Dose:  100 mg


Sodium Chloride (Nacl 0.9%)  1,000 mls @ 50 mls/hr IV .Q20H JESSICA


   Stop: 04/02/19 09:59


   Last Admin: 02/04/19 09:35 Dose:  50 mls/hr


Levetiracetam (Keppra)  1,000 mg PO BID [facility]


   Stop: 04/02/19 16:59


   Last Admin: 02/04/19 09:33 Dose:  1,000 mg


Lorazepam (Ativan)  0.5 mg PO Q4HR PRN; Protocol


   PRN Reason: Anxiety


   Stop: 04/02/19 13:56


Lorazepam (Ativan)  2 mg IVP Q4HR PRN; Protocol


   PRN Reason: Seizures


   Stop: 04/02/19 14:06


Magnesium Hydroxide (Milk Of Magnesia)  30 ml PO HS PRN


   PRN Reason: Constipation


   Stop: 04/02/19 13:56


Multivitamins/Vitamin C (Theragran)  1 tab PO DAILY [facility]


   Stop: 04/03/19 08:59


   Last Admin: 02/04/19 09:33 Dose:  1 tab


Quetiapine Fumarate (Seroquel)  25 mg PO BID [facility]; Protocol


   Stop: 04/02/19 16:59


   Last Admin: 02/04/19 09:33 Dose:  25 mg


Zolpidem Tartrate (Ambien)  5 mg PO HS PRN


   PRN Reason: Insomnia


   Stop: 04/02/19 13:56








General: No acute distress


HEENT: Other (normal)


Neck: Supple, JVD, +2 carotid pulse wo bruit


Cardiovascular: Regular rate, Normal S1, Normal S2, Systolic murmurs


Lungs: Clear to auscultation, Normal air movement


Abdomen: Soft


Extremities: Pulses (normal)


Neurological: Other (grand mal seizures)





Assessment/Plan





- Assessment


Assessment: 





Grand mal seizures


Hyperlipidemia


GERD


Dementia


Urinary tract infection


Psychosis





- Plan


Plan: 





Continue patient on Tegretol will give Ativan IV as necessary

## 2019-02-04 NOTE — DIAGNOSTIC IMAGING REPORT
CT head without and with IV contrast



History: Seizures



Comparison: Head CT on 12/20/2015



Technique: Axial images were obtained from the vertex to the skull base

before and following administration of IV contrast.  Reconstructions

were made.  Total DLP 1815, CTD I 91



Findings:



Without contrast images are markedly limited due to motion.  No gross

hemorrhage is identified.  Atrophy is noted.  The gray-white matter

differentiation is preserved.  The ventricles and basal cisterns are

patent.  No mass effect or midline shift.  No obvious skull fracture. 

There may have been old previous nasal fractures which is partially

visualized.  No enhancing mass lesions identified.  The visualized

paranasal sinuses are clear.



IMPRESSION:



Limited exam due to motion.  No evidence of acute hemorrhage.



No enhancing mass lesions identified



Atrophy.



Possible previous nasal fracture is age-indeterminate and may be

chronic.  These correlate with clinical findings.

## 2019-02-04 NOTE — INFECTIOUS DISEASE PROG NOTE
Infectious Disease Subjective





- Review of Systems


Service Date: 02/03/19


Subjective: 





There is no new change, no fever.No seizure episode. EEG done.





Infectious Disease Objective





- Results


Result Diagrams: 


 02/03/19 05:56





 02/03/19 05:56


Recent Labs: 


 Laboratory Last Values











WBC  9.0 Th/cmm (4.8-10.8)   02/03/19  05:56    


 


RBC  3.95 Mil/cmm (4.30-5.70)  L  02/03/19  05:56    


 


Hgb  12.5 gm/dL (12-16)   02/03/19  05:56    


 


Hct  37.7 % (41.0-60)  L  02/03/19  05:56    


 


MCV  95.3 fl (80-99)   02/03/19  05:56    


 


MCH  31.7 pg (26.0-30.0)  H  02/03/19  05:56    


 


MCHC Differential  33.3 pg (28.0-36.0)   02/03/19  05:56    


 


RDW  12.8 % (11.5-20.0)   02/03/19  05:56    


 


Plt Count  168 Th/cmm (150-400)   02/03/19  05:56    


 


MPV  8.8 fl  02/03/19  05:56    


 


Add Manual Diff  YES   02/01/19  14:17    


 


Neutrophils %  79.8 % (40.0-80.0)   02/03/19  05:56    


 


Band Neutrophils %  0 % (0-10)   02/01/19  14:17    


 


Lymphocytes %  10.3 % (20.0-50.0)  L  02/03/19  05:56    


 


Monocytes %  9.2 % (2.0-10.0)   02/03/19  05:56    


 


Eosinophils %  0.5 % (0.0-5.0)   02/03/19  05:56    


 


Basophils %  0.2 % (0.0-2.0)   02/03/19  05:56    


 


Neutrophils (Manual)  90 % (40-80)  H  02/01/19  14:17    


 


Lymphocytes  4 % (20-50)  L  02/01/19  14:17    


 


Monocytes  5 % (2-10)   02/01/19  14:17    


 


Eosinophils  1 % (0-5)   02/01/19  14:17    


 


Basophils  0 % (0-3)   02/01/19  14:17    


 


Sodium  139 mEq/L (136-145)   02/03/19  05:56    


 


Potassium  3.6 mEq/L (3.5-5.1)   02/03/19  05:56    


 


Chloride  104 mEq/L ()   02/03/19  05:56    


 


Carbon Dioxide  28.1 mEq/L (21.0-31.0)   02/03/19  05:56    


 


Anion Gap  10.5  (7.0-16.0)   02/03/19  05:56    


 


BUN  11 mg/dL (7-25)   02/03/19  05:56    


 


Creatinine  0.6 mg/dL (0.7-1.3)  L  02/03/19  05:56    


 


Est GFR ( Amer)  > 60.0 ml/min (>90)   02/03/19  05:56    


 


Est GFR (Non-Af Amer)  > 60.0 ml/min  02/03/19  05:56    


 


BUN/Creatinine Ratio  18.3   02/03/19  05:56    


 


Glucose  116 mg/dL ()  H  02/03/19  05:56    


 


Calcium  8.7 mg/dL (8.6-10.3)   02/03/19  05:56    














- Physical Exam


Vitals and I&O: 


 Vital Signs











Temp  99.3 F   02/03/19 17:00


 


Pulse  79   02/03/19 15:45


 


Resp  18   02/03/19 22:00


 


BP  134/83   02/03/19 15:45


 


Pulse Ox  95   02/03/19 15:45








 Intake & Output











 02/03/19 02/03/19 02/04/19





 06:59 18:59 06:59


 


Intake Total 200 1600 


 


Balance 200 1600 


 


Weight (lbs) 63.503 kg 63.503 kg 


 


Intake:   


 


  Intake, IV Amount  1000 


 


    Sodium Chloride 0.9% 1,  1000 





    000 ml @ 50 mls/hr IV .   





    Q20H JESSICA Rx#:[id number]   


 


  Oral 200 600 


 


Other:   


 


  # Voids 2 3 


 


  # Bowel Movements 0 0 


 


  Weight Source Bedscale Bedscale 











Active Medications: 


Current Medications





Acetaminophen (Tylenol)  650 mg PO Q4HR PRN


   PRN Reason: Mild Pain / Temp above 100


   Stop: 04/02/19 13:56


   Last Admin: 02/03/19 16:05 Dose:  650 mg


Al Hydrox/Mg Hydrox/Simethicone (Maalox)  30 ml PO Q4HR PRN


   PRN Reason: GI DISTRESS


   Stop: 04/02/19 13:56


Citalopram Hydrobromide (Celexa)  20 mg PO DAILY [facility]; Protocol


   Stop: 04/03/19 08:59


   Last Admin: 02/03/19 08:24 Dose:  20 mg


Docusate Sodium (Colace)  100 mg PO DAILY [facility]


   Stop: 04/03/19 08:59


   Last Admin: 02/03/19 08:24 Dose:  100 mg


Sodium Chloride (Nacl 0.9%)  1,000 mls @ 50 mls/hr IV .Q20H JESSICA


   Stop: 04/02/19 09:59


   Last Admin: 02/03/19 14:26 Dose:  50 mls/hr


Levetiracetam (Keppra)  1,000 mg PO BID [facility]


   Stop: 04/02/19 16:59


   Last Admin: 02/03/19 16:04 Dose:  1,000 mg


Lorazepam (Ativan)  0.5 mg PO Q4HR PRN; Protocol


   PRN Reason: Anxiety


   Stop: 04/02/19 13:56


Lorazepam (Ativan)  2 mg IVP Q4HR PRN; Protocol


   PRN Reason: Seizures


   Stop: 04/02/19 14:06


Magnesium Hydroxide (Milk Of Magnesia)  30 ml PO HS PRN


   PRN Reason: Constipation


   Stop: 04/02/19 13:56


Multivitamins/Vitamin C (Theragran)  1 tab PO DAILY [facility]


   Stop: 04/03/19 08:59


   Last Admin: 02/03/19 08:24 Dose:  1 tab


Quetiapine Fumarate (Seroquel)  25 mg PO BID [facility]; Protocol


   Stop: 04/02/19 16:59


   Last Admin: 02/03/19 16:06 Dose:  25 mg


Zolpidem Tartrate (Ambien)  5 mg PO HS PRN


   PRN Reason: Insomnia


   Stop: 04/02/19 13:56








General: no acute distress, well developed, well nourished


HEENT: atraumatic, normocephalic, PERRLA, EOMI, moist mucous membrane


Neck: supple, no thyromegaly


Cardiovascular: S1S2, regular


Lungs: clear to auscultation bilaterally, clear to percussion


Abdomen: soft, no tender, no distended, no mass


Extremities: no cyanosis, no clubbing, no edema


Neurological: awake, alert, oriented


Skin: intact





Infectious Disease Assmt/Plan





- Assessment


Assessment: 





1.  Most likely a breakthrough seizures.


2.  Hypertension.


3.  Psychosis, depression.


4.  Dementia.


5.  Agitation.





- Plan


Plan: 


CT head neg


CPM.


EEG needs to read by Dr Thurston.


Check Keppra level.


DC plan.


Consult DR Thurston.

## 2019-02-05 NOTE — GENERAL PROGRESS NOTE
Subjective





- Review of Systems


Service Date: 02/05/19


Subjective: 





Patient has no seizures less congestion lethargic





Objective





- Results


Result Diagrams: 


 02/03/19 05:56





 02/03/19 05:56


Recent Labs: 


 Laboratory Last Values











WBC  9.0 Th/cmm (4.8-10.8)   02/03/19  05:56    


 


RBC  3.95 Mil/cmm (4.30-5.70)  L  02/03/19  05:56    


 


Hgb  12.5 gm/dL (12-16)   02/03/19  05:56    


 


Hct  37.7 % (41.0-60)  L  02/03/19  05:56    


 


MCV  95.3 fl (80-99)   02/03/19  05:56    


 


MCH  31.7 pg (26.0-30.0)  H  02/03/19  05:56    


 


MCHC Differential  33.3 pg (28.0-36.0)   02/03/19  05:56    


 


RDW  12.8 % (11.5-20.0)   02/03/19  05:56    


 


Plt Count  168 Th/cmm (150-400)   02/03/19  05:56    


 


MPV  8.8 fl  02/03/19  05:56    


 


Add Manual Diff  YES   02/01/19  14:17    


 


Neutrophils %  79.8 % (40.0-80.0)   02/03/19  05:56    


 


Band Neutrophils %  0 % (0-10)   02/01/19  14:17    


 


Lymphocytes %  10.3 % (20.0-50.0)  L  02/03/19  05:56    


 


Monocytes %  9.2 % (2.0-10.0)   02/03/19  05:56    


 


Eosinophils %  0.5 % (0.0-5.0)   02/03/19  05:56    


 


Basophils %  0.2 % (0.0-2.0)   02/03/19  05:56    


 


Neutrophils (Manual)  90 % (40-80)  H  02/01/19  14:17    


 


Lymphocytes  4 % (20-50)  L  02/01/19  14:17    


 


Monocytes  5 % (2-10)   02/01/19  14:17    


 


Eosinophils  1 % (0-5)   02/01/19  14:17    


 


Basophils  0 % (0-3)   02/01/19  14:17    


 


Sodium  139 mEq/L (136-145)   02/03/19  05:56    


 


Potassium  3.6 mEq/L (3.5-5.1)   02/03/19  05:56    


 


Chloride  104 mEq/L ()   02/03/19  05:56    


 


Carbon Dioxide  28.1 mEq/L (21.0-31.0)   02/03/19  05:56    


 


Anion Gap  10.5  (7.0-16.0)   02/03/19  05:56    


 


BUN  11 mg/dL (7-25)   02/03/19  05:56    


 


Creatinine  0.6 mg/dL (0.7-1.3)  L  02/03/19  05:56    


 


Est GFR ( Amer)  > 60.0 ml/min (>90)   02/03/19  05:56    


 


Est GFR (Non-Af Amer)  > 60.0 ml/min  02/03/19  05:56    


 


BUN/Creatinine Ratio  18.3   02/03/19  05:56    


 


Glucose  116 mg/dL ()  H  02/03/19  05:56    


 


Calcium  8.7 mg/dL (8.6-10.3)   02/03/19  05:56    


 


Levetiracetam  None Detected ug/mL (10.0-40.0)   02/01/19  14:17    














- Physical Exam


Vitals and I&O: 


 Vital Signs











Temp  99.0 F   02/05/19 08:00


 


Pulse  61   02/05/19 08:00


 


Resp  18   02/05/19 10:00


 


BP  129/76   02/05/19 08:00


 


Pulse Ox  95   02/05/19 08:00








 Intake & Output











 02/04/19 02/05/19 02/05/19





 18:59 06:59 18:59


 


Intake Total 957.5 1168.333 


 


Output Total  450 


 


Balance 957.5 718.333 


 


Weight (lbs)  62.596 kg 


 


Intake:   


 


  Intake, IV Amount 957.5 968.333 


 


    Sodium Chloride 0.9% 1, 957.5 968.333 





    000 ml @ 50 mls/hr IV .   





    Q20H JESSICA Rx#:[id number]   


 


  Oral  200 


 


Output:   


 


  Urine  450 


 


Other:   


 


  # Bowel Movements  0 


 


  Weight Source  Bedscale 











Active Medications: 


Current Medications





Acetaminophen (Tylenol)  650 mg PO Q4HR PRN


   PRN Reason: Mild Pain / Temp above 100


   Stop: 04/02/19 13:56


   Last Admin: 02/03/19 16:05 Dose:  650 mg


Al Hydrox/Mg Hydrox/Simethicone (Maalox)  30 ml PO Q4HR PRN


   PRN Reason: GI DISTRESS


   Stop: 04/02/19 13:56


Citalopram Hydrobromide (Celexa)  20 mg PO DAILY [facility]; Protocol


   Stop: 04/03/19 08:59


   Last Admin: 02/05/19 09:48 Dose:  20 mg


Docusate Sodium (Colace)  100 mg PO DAILY [facility]


   Stop: 04/03/19 08:59


   Last Admin: 02/05/19 09:48 Dose:  100 mg


Sodium Chloride (Nacl 0.9%)  1,000 mls @ 50 mls/hr IV .Q20H [facility]


   Stop: 04/02/19 09:59


   Last Admin: 02/05/19 04:57 Dose:  50 mls/hr


Levetiracetam (Keppra)  1,000 mg PO BID [facility]


   Stop: 04/02/19 16:59


   Last Admin: 02/05/19 09:48 Dose:  1,000 mg


Lorazepam (Ativan)  0.5 mg PO Q4HR PRN; Protocol


   PRN Reason: Anxiety


   Stop: 04/02/19 13:56


Lorazepam (Ativan)  2 mg IVP Q4HR PRN; Protocol


   PRN Reason: Seizures


   Stop: 04/02/19 14:06


Magnesium Hydroxide (Milk Of Magnesia)  30 ml PO HS PRN


   PRN Reason: Constipation


   Stop: 04/02/19 13:56


Multivitamins/Vitamin C (Theragran)  1 tab PO DAILY [facility]


   Stop: 04/03/19 08:59


   Last Admin: 02/05/19 09:48 Dose:  1 tab


Quetiapine Fumarate (Seroquel)  25 mg PO BID [facility]; Protocol


   Stop: 04/02/19 16:59


   Last Admin: 02/05/19 09:48 Dose:  25 mg


Zolpidem Tartrate (Ambien)  5 mg PO HS PRN


   PRN Reason: Insomnia


   Stop: 04/02/19 13:56








General: No acute distress


HEENT: Other (normal)


Neck: Supple, JVD, +2 carotid pulse wo bruit


Cardiovascular: Regular rate, Normal S1, Normal S2, Systolic murmurs


Lungs: Clear to auscultation, Normal air movement


Abdomen: Soft


Extremities: Pulses (normal)


Neurological: Other (grand mal seizures)





Assessment/Plan





- Assessment


Assessment: 





Grand mal seizures


Hyperlipidemia


GERD


Dementia


Urinary tract infection


Psychosis





- Plan


Plan: 





Continue patient on Tegretol will give Ativan IV as necessary

## 2019-02-05 NOTE — INFECTIOUS DISEASE PROG NOTE
Infectious Disease Subjective





- Review of Systems


Service Date: 02/05/19


Subjective: 





There is no new change, no fever.No seizure episode. EEG done.





Infectious Disease Objective





- Results


Result Diagrams: 


 02/03/19 05:56





 02/03/19 05:56


Recent Labs: 


 Laboratory Last Values











WBC  9.0 Th/cmm (4.8-10.8)   02/03/19  05:56    


 


RBC  3.95 Mil/cmm (4.30-5.70)  L  02/03/19  05:56    


 


Hgb  12.5 gm/dL (12-16)   02/03/19  05:56    


 


Hct  37.7 % (41.0-60)  L  02/03/19  05:56    


 


MCV  95.3 fl (80-99)   02/03/19  05:56    


 


MCH  31.7 pg (26.0-30.0)  H  02/03/19  05:56    


 


MCHC Differential  33.3 pg (28.0-36.0)   02/03/19  05:56    


 


RDW  12.8 % (11.5-20.0)   02/03/19  05:56    


 


Plt Count  168 Th/cmm (150-400)   02/03/19  05:56    


 


MPV  8.8 fl  02/03/19  05:56    


 


Add Manual Diff  YES   02/01/19  14:17    


 


Neutrophils %  79.8 % (40.0-80.0)   02/03/19  05:56    


 


Band Neutrophils %  0 % (0-10)   02/01/19  14:17    


 


Lymphocytes %  10.3 % (20.0-50.0)  L  02/03/19  05:56    


 


Monocytes %  9.2 % (2.0-10.0)   02/03/19  05:56    


 


Eosinophils %  0.5 % (0.0-5.0)   02/03/19  05:56    


 


Basophils %  0.2 % (0.0-2.0)   02/03/19  05:56    


 


Neutrophils (Manual)  90 % (40-80)  H  02/01/19  14:17    


 


Lymphocytes  4 % (20-50)  L  02/01/19  14:17    


 


Monocytes  5 % (2-10)   02/01/19  14:17    


 


Eosinophils  1 % (0-5)   02/01/19  14:17    


 


Basophils  0 % (0-3)   02/01/19  14:17    


 


Sodium  139 mEq/L (136-145)   02/03/19  05:56    


 


Potassium  3.6 mEq/L (3.5-5.1)   02/03/19  05:56    


 


Chloride  104 mEq/L ()   02/03/19  05:56    


 


Carbon Dioxide  28.1 mEq/L (21.0-31.0)   02/03/19  05:56    


 


Anion Gap  10.5  (7.0-16.0)   02/03/19  05:56    


 


BUN  11 mg/dL (7-25)   02/03/19  05:56    


 


Creatinine  0.6 mg/dL (0.7-1.3)  L  02/03/19  05:56    


 


Est GFR ( Amer)  > 60.0 ml/min (>90)   02/03/19  05:56    


 


Est GFR (Non-Af Amer)  > 60.0 ml/min  02/03/19  05:56    


 


BUN/Creatinine Ratio  18.3   02/03/19  05:56    


 


Glucose  116 mg/dL ()  H  02/03/19  05:56    


 


Calcium  8.7 mg/dL (8.6-10.3)   02/03/19  05:56    


 


Levetiracetam  None Detected ug/mL (10.0-40.0)   02/01/19  14:17    














- Physical Exam


Vitals and I&O: 


 Vital Signs











Temp  99.0 F   02/05/19 20:00


 


Pulse  75   02/05/19 20:00


 


Resp  18   02/05/19 21:21


 


BP  122/73   02/05/19 20:00


 


Pulse Ox  95   02/05/19 20:00








 Intake & Output











 02/05/19 02/05/19 02/06/19





 06:59 18:59 06:59


 


Intake Total 1168.333 500 


 


Output Total 450  


 


Balance 718.333 500 


 


Weight (lbs) 62.596 kg 62.596 kg 


 


Intake:   


 


  Intake, IV Amount 968.333  


 


    Sodium Chloride 0.9% 1, 968.333  





    000 ml @ 50 mls/hr IV .   





    Q20H JESSICA Rx#:[id number]   


 


  Oral 200 500 


 


Output:   


 


  Urine 450  


 


Other:   


 


  # Voids  3 


 


  # Bowel Movements 0 1 


 


  Weight Source Bedscale Bedscale 











Active Medications: 


Current Medications





Acetaminophen (Tylenol)  650 mg PO Q4HR PRN


   PRN Reason: Mild Pain / Temp above 100


   Stop: 04/02/19 13:56


   Last Admin: 02/03/19 16:05 Dose:  650 mg


Al Hydrox/Mg Hydrox/Simethicone (Maalox)  30 ml PO Q4HR PRN


   PRN Reason: GI DISTRESS


   Stop: 04/02/19 13:56


Citalopram Hydrobromide (Celexa)  20 mg PO DAILY [facility]; Protocol


   Stop: 04/03/19 08:59


   Last Admin: 02/05/19 09:48 Dose:  20 mg


Docusate Sodium (Colace)  100 mg PO DAILY [facility]


   Stop: 04/03/19 08:59


   Last Admin: 02/05/19 09:48 Dose:  100 mg


Sodium Chloride (Nacl 0.9%)  1,000 mls @ 50 mls/hr IV .Q20H [facility]


   Stop: 04/02/19 09:59


   Last Admin: 02/05/19 04:57 Dose:  50 mls/hr


Levetiracetam (Keppra)  1,000 mg PO BID [facility]


   Stop: 04/02/19 16:59


   Last Admin: 02/05/19 17:04 Dose:  1,000 mg


Lorazepam (Ativan)  0.5 mg PO Q4HR PRN; Protocol


   PRN Reason: Anxiety


   Stop: 04/02/19 13:56


Lorazepam (Ativan)  2 mg IVP Q4HR PRN; Protocol


   PRN Reason: Seizures


   Stop: 04/02/19 14:06


Magnesium Hydroxide (Milk Of Magnesia)  30 ml PO HS PRN


   PRN Reason: Constipation


   Stop: 04/02/19 13:56


Multivitamins/Vitamin C (Theragran)  1 tab PO DAILY [facility]


   Stop: 04/03/19 08:59


   Last Admin: 02/05/19 09:48 Dose:  1 tab


Quetiapine Fumarate (Seroquel)  25 mg PO BID [facility]; Protocol


   Stop: 04/02/19 16:59


   Last Admin: 02/05/19 17:04 Dose:  25 mg


Zolpidem Tartrate (Ambien)  5 mg PO HS PRN


   PRN Reason: Insomnia


   Stop: 04/02/19 13:56








General: no acute distress, well developed, well nourished


HEENT: atraumatic, normocephalic, PERRLA


Neck: supple, no thyromegaly


Cardiovascular: S1S2, regular


Lungs: clear to auscultation bilaterally, clear to percussion


Abdomen: soft, no tender, no distended


Extremities: no cyanosis, no clubbing, no edema


Neurological: awake, alert, oriented


Skin: intact





Infectious Disease Assmt/Plan





- Assessment


Assessment: 





1.  Most likely a breakthrough seizures. Keppra level undetectable. ? 

noncompliance.


2.  Hypertension.


3.  Psychosis, depression.


4.  Dementia.


5.  Agitation.





- Plan


Plan: 


CT head neg


CPM.


EEG needs to read by Dr Thurston.


DC plan.

## 2019-02-05 NOTE — PROGRESS NOTES
DATE:     02/04/2019



SUBJECTIVE:  The patient is seen in his room and interviewed.  The patient

presents as somnolent, but arousable by touch, but not verbally.  Mood is less

irritable.  Affect is mood congruent.  The patient is being closely monitored

for seizure activity.  Staff reports no major behavioral problems and that the

patient has been compliant with his care.



OBJECTIVE:  Mood less irritable.  Affect appropriate.  Thought process shows to

be linear and concrete, but more goal oriented.  The patient has been compliant

with his care and treatment.



ASSESSMENT AND PLAN:  The patient seems to be improving.  Prognosis is fair.  We

provided with supportive psychotherapy as well as positive reinforcement for the

patient to continue to be compliant with his care and treatment.  We provided

coping strategies for the patient phase of life issues as well as to adjust to

his current medical condition.  We will follow up in 2-3 days if the patient is

still admitted on the unit.





DD: 02/04/2019 13:10

DT: 02/05/2019 05:18

JOB# [id number]  [id number]

ALBARO

## 2019-02-05 NOTE — INFECTIOUS DISEASE PROG NOTE
Infectious Disease Subjective





- Review of Systems


Service Date: 02/04/19


Subjective: 





There is no new change, no fever.No seizure episode. EEG done.





Infectious Disease Objective





- Results


Result Diagrams: 


 02/03/19 05:56





 02/03/19 05:56


Recent Labs: 


 Laboratory Last Values











WBC  9.0 Th/cmm (4.8-10.8)   02/03/19  05:56    


 


RBC  3.95 Mil/cmm (4.30-5.70)  L  02/03/19  05:56    


 


Hgb  12.5 gm/dL (12-16)   02/03/19  05:56    


 


Hct  37.7 % (41.0-60)  L  02/03/19  05:56    


 


MCV  95.3 fl (80-99)   02/03/19  05:56    


 


MCH  31.7 pg (26.0-30.0)  H  02/03/19  05:56    


 


MCHC Differential  33.3 pg (28.0-36.0)   02/03/19  05:56    


 


RDW  12.8 % (11.5-20.0)   02/03/19  05:56    


 


Plt Count  168 Th/cmm (150-400)   02/03/19  05:56    


 


MPV  8.8 fl  02/03/19  05:56    


 


Add Manual Diff  YES   02/01/19  14:17    


 


Neutrophils %  79.8 % (40.0-80.0)   02/03/19  05:56    


 


Band Neutrophils %  0 % (0-10)   02/01/19  14:17    


 


Lymphocytes %  10.3 % (20.0-50.0)  L  02/03/19  05:56    


 


Monocytes %  9.2 % (2.0-10.0)   02/03/19  05:56    


 


Eosinophils %  0.5 % (0.0-5.0)   02/03/19  05:56    


 


Basophils %  0.2 % (0.0-2.0)   02/03/19  05:56    


 


Neutrophils (Manual)  90 % (40-80)  H  02/01/19  14:17    


 


Lymphocytes  4 % (20-50)  L  02/01/19  14:17    


 


Monocytes  5 % (2-10)   02/01/19  14:17    


 


Eosinophils  1 % (0-5)   02/01/19  14:17    


 


Basophils  0 % (0-3)   02/01/19  14:17    


 


Sodium  139 mEq/L (136-145)   02/03/19  05:56    


 


Potassium  3.6 mEq/L (3.5-5.1)   02/03/19  05:56    


 


Chloride  104 mEq/L ()   02/03/19  05:56    


 


Carbon Dioxide  28.1 mEq/L (21.0-31.0)   02/03/19  05:56    


 


Anion Gap  10.5  (7.0-16.0)   02/03/19  05:56    


 


BUN  11 mg/dL (7-25)   02/03/19  05:56    


 


Creatinine  0.6 mg/dL (0.7-1.3)  L  02/03/19  05:56    


 


Est GFR ( Amer)  > 60.0 ml/min (>90)   02/03/19  05:56    


 


Est GFR (Non-Af Amer)  > 60.0 ml/min  02/03/19  05:56    


 


BUN/Creatinine Ratio  18.3   02/03/19  05:56    


 


Glucose  116 mg/dL ()  H  02/03/19  05:56    


 


Calcium  8.7 mg/dL (8.6-10.3)   02/03/19  05:56    


 


Levetiracetam  None Detected ug/mL (10.0-40.0)   02/01/19  14:17    














- Physical Exam


Vitals and I&O: 


 Vital Signs











Temp  98.4 F   02/05/19 00:00


 


Pulse  64   02/05/19 00:00


 


Resp  18   02/05/19 00:00


 


BP  113/70   02/05/19 00:00


 


Pulse Ox  97   02/05/19 00:00








 Intake & Output











 02/04/19 02/04/19 02/05/19





 06:59 18:59 06:59


 


Intake Total 200 957.5 


 


Output Total 500  


 


Balance -300 957.5 


 


Weight (lbs) 63.503 kg  


 


Intake:   


 


  Intake, IV Amount  957.5 


 


    Sodium Chloride 0.9% 1,  957.5 





    000 ml @ 50 mls/hr IV .   





    Q20H [facility] Rx#:[id number]   


 


  Oral 200  


 


Output:   


 


  Urine 500  


 


Other:   


 


  # Voids 0  


 


  # Bowel Movements 0  


 


  Weight Source Bedscale  











Active Medications: 


Current Medications





Acetaminophen (Tylenol)  650 mg PO Q4HR PRN


   PRN Reason: Mild Pain / Temp above 100


   Stop: 04/02/19 13:56


   Last Admin: 02/03/19 16:05 Dose:  650 mg


Al Hydrox/Mg Hydrox/Simethicone (Maalox)  30 ml PO Q4HR PRN


   PRN Reason: GI DISTRESS


   Stop: 04/02/19 13:56


Citalopram Hydrobromide (Celexa)  20 mg PO DAILY [facility]; Protocol


   Stop: 04/03/19 08:59


   Last Admin: 02/04/19 09:33 Dose:  20 mg


Docusate Sodium (Colace)  100 mg PO DAILY [facility]


   Stop: 04/03/19 08:59


   Last Admin: 02/04/19 09:33 Dose:  100 mg


Sodium Chloride (Nacl 0.9%)  1,000 mls @ 50 mls/hr IV .Q20H [facility]


   Stop: 04/02/19 09:59


   Last Admin: 02/04/19 09:35 Dose:  50 mls/hr


Levetiracetam (Keppra)  1,000 mg PO BID [facility]


   Stop: 04/02/19 16:59


   Last Admin: 02/04/19 17:39 Dose:  1,000 mg


Lorazepam (Ativan)  0.5 mg PO Q4HR PRN; Protocol


   PRN Reason: Anxiety


   Stop: 04/02/19 13:56


Lorazepam (Ativan)  2 mg IVP Q4HR PRN; Protocol


   PRN Reason: Seizures


   Stop: 04/02/19 14:06


Magnesium Hydroxide (Milk Of Magnesia)  30 ml PO HS PRN


   PRN Reason: Constipation


   Stop: 04/02/19 13:56


Multivitamins/Vitamin C (Theragran)  1 tab PO DAILY [facility]


   Stop: 04/03/19 08:59


   Last Admin: 02/04/19 09:33 Dose:  1 tab


Quetiapine Fumarate (Seroquel)  25 mg PO BID [facility]; Protocol


   Stop: 04/02/19 16:59


   Last Admin: 02/04/19 17:39 Dose:  25 mg


Zolpidem Tartrate (Ambien)  5 mg PO HS PRN


   PRN Reason: Insomnia


   Stop: 04/02/19 13:56








General: no acute distress, well developed, well nourished


HEENT: atraumatic, normocephalic, PERRLA, EOMI


Neck: supple, no thyromegaly


Cardiovascular: S1S2, regular


Lungs: clear to auscultation bilaterally, clear to percussion


Abdomen: soft, no tender, no distended, no mass


Extremities: no cyanosis, no clubbing


Neurological: awake, alert, oriented





Infectious Disease Assmt/Plan





- Assessment


Assessment: 





1.  Most likely a breakthrough seizures.


2.  Hypertension.


3.  Psychosis, depression.


4.  Dementia.


5.  Agitation.





- Plan


Plan: 


CT head neg


CPM.


EEG needs to read by Dr Thurston.


Check Keppra level.


DC plan.


Consult DR Thurston.

## 2019-02-06 NOTE — CONSULTATION
DATE OF CONSULTATION:  02/05/2019



NEUROLOGY CONSULTATION



HISTORY OF PRESENT ILLNESS:  This is a 58-year-old male transferred from

[facility].  The patient there because of psychosis.  The patient then had a

seizure, tonic-clonic.  The patient has known history of seizures.



The patient at the moment stable, no repeat seizures.



PAST MEDICAL HISTORY:

1.  The patient has early onset dementia, pretty severe.  The patient is able to

talk, but very limited.  He answers to his name.  Does not tell me what day or

month.

2.  The patient has history of depression, psychosis.

3.  Hypertension.

4.  Seizure disorder, anxiety.



MEDICATIONS:  As per reconciliation.  Here, the patient is on Celexa, Keppra

1000 b.i.d., lorazepam p.r.n., Seroquel 25 b.i.d., Ambien 5 mg at bedtime.



REVIEW OF SYSTEMS:  Twelve-point negative except for above.



PHYSICAL EXAMINATION:

VITAL SIGNS:  98.4, blood pressure 113/70, pulse is 64.

NECK:  Supple, no bruits.

HEART:  Sounds S1, S2.

LUNGS:  Clear.

NEUROLOGIC:  The patient is lying in bed.  He initially had his eyes closed. 

Then, he opens his eyes.  The patient will give his name.  Do not get much more

from him, not able to tell me what day, what month.  Pupils reactive to light. 

He will lift arms up, but really very significantly increased tone with a

combination of predominantly rigidity, not that much spasticity.  In the lower

extremity, he will withdraw also.  The patient again increased tone with quite a

lot of spasticity also there.



A CT scan of the head, lot of atrophy.  The patient has lot of movement,

therefore, suboptimal study.  No acute process seen.  No acute stroke or

hemorrhage.



ASSESSMENT:

1.  Seizure with a history of seizures.  We will continue Keppra 1000 b.i.d.

2.  The patient with early onset dementia with some extrapyramidal signs and I

do not know whether these were present before or he may have been on

antipsychotics.  Anyway, etiology of the dementia unclear and he has been

followed by neurologist as an outpatient.  This has been going for 6-7 years. 

Continue present treatment.

3.  History of anxiety.





DD: 02/05/2019 10:10

DT: 02/06/2019 12:10

JOB# [id number]  [id number]

## 2019-02-06 NOTE — INFECTIOUS DISEASE PROG NOTE
Infectious Disease Subjective





- Review of Systems


Service Date: 02/06/19


Subjective: 





There is no new change, no fever.No seizure episode. EEG done.





Infectious Disease Objective





- Results


Result Diagrams: 


 02/03/19 05:56





 02/03/19 05:56


Recent Labs: 


 Laboratory Last Values











WBC  9.0 Th/cmm (4.8-10.8)   02/03/19  05:56    


 


RBC  3.95 Mil/cmm (4.30-5.70)  L  02/03/19  05:56    


 


Hgb  12.5 gm/dL (12-16)   02/03/19  05:56    


 


Hct  37.7 % (41.0-60)  L  02/03/19  05:56    


 


MCV  95.3 fl (80-99)   02/03/19  05:56    


 


MCH  31.7 pg (26.0-30.0)  H  02/03/19  05:56    


 


MCHC Differential  33.3 pg (28.0-36.0)   02/03/19  05:56    


 


RDW  12.8 % (11.5-20.0)   02/03/19  05:56    


 


Plt Count  168 Th/cmm (150-400)   02/03/19  05:56    


 


MPV  8.8 fl  02/03/19  05:56    


 


Add Manual Diff  YES   02/01/19  14:17    


 


Neutrophils %  79.8 % (40.0-80.0)   02/03/19  05:56    


 


Band Neutrophils %  0 % (0-10)   02/01/19  14:17    


 


Lymphocytes %  10.3 % (20.0-50.0)  L  02/03/19  05:56    


 


Monocytes %  9.2 % (2.0-10.0)   02/03/19  05:56    


 


Eosinophils %  0.5 % (0.0-5.0)   02/03/19  05:56    


 


Basophils %  0.2 % (0.0-2.0)   02/03/19  05:56    


 


Neutrophils (Manual)  90 % (40-80)  H  02/01/19  14:17    


 


Lymphocytes  4 % (20-50)  L  02/01/19  14:17    


 


Monocytes  5 % (2-10)   02/01/19  14:17    


 


Eosinophils  1 % (0-5)   02/01/19  14:17    


 


Basophils  0 % (0-3)   02/01/19  14:17    


 


Sodium  139 mEq/L (136-145)   02/03/19  05:56    


 


Potassium  3.6 mEq/L (3.5-5.1)   02/03/19  05:56    


 


Chloride  104 mEq/L ()   02/03/19  05:56    


 


Carbon Dioxide  28.1 mEq/L (21.0-31.0)   02/03/19  05:56    


 


Anion Gap  10.5  (7.0-16.0)   02/03/19  05:56    


 


BUN  11 mg/dL (7-25)   02/03/19  05:56    


 


Creatinine  0.6 mg/dL (0.7-1.3)  L  02/03/19  05:56    


 


Est GFR ( Amer)  > 60.0 ml/min (>90)   02/03/19  05:56    


 


Est GFR (Non-Af Amer)  > 60.0 ml/min  02/03/19  05:56    


 


BUN/Creatinine Ratio  18.3   02/03/19  05:56    


 


Glucose  116 mg/dL ()  H  02/03/19  05:56    


 


Calcium  8.7 mg/dL (8.6-10.3)   02/03/19  05:56    


 


Levetiracetam  None Detected ug/mL (10.0-40.0)   02/01/19  14:17    














- Physical Exam


Vitals and I&O: 


 Vital Signs











Temp  97.9 F   02/06/19 07:49


 


Pulse  69   02/06/19 07:49


 


Resp  18   02/06/19 07:49


 


BP  140/81   02/06/19 07:49


 


Pulse Ox  98   02/06/19 07:49








 Intake & Output











 02/05/19 02/06/19 02/06/19





 18:59 06:59 18:59


 


Intake Total 500 1015 


 


Balance 500 1015 


 


Weight (lbs) 62.596 kg 62.596 kg 


 


Intake:   


 


  Intake, IV Amount  1000 


 


    Sodium Chloride 0.9% 1,  1000 





    000 ml @ 50 mls/hr IV .   





    Q20H [facility] Rx#:[id number]   


 


  Oral 500 15 


 


Other:   


 


  # Voids 3 4 


 


  # Bowel Movements 1 1 


 


  Weight Source Bedscale Bedscale 











Active Medications: 


Current Medications





Acetaminophen (Tylenol)  650 mg PO Q4HR PRN


   PRN Reason: Mild Pain / Temp above 100


   Stop: 04/02/19 13:56


   Last Admin: 02/03/19 16:05 Dose:  650 mg


Al Hydrox/Mg Hydrox/Simethicone (Maalox)  30 ml PO Q4HR PRN


   PRN Reason: GI DISTRESS


   Stop: 04/02/19 13:56


Citalopram Hydrobromide (Celexa)  20 mg PO DAILY [facility]; Protocol


   Stop: 04/03/19 08:59


   Last Admin: 02/06/19 09:09 Dose:  20 mg


Docusate Sodium (Colace)  100 mg PO DAILY [facility]


   Stop: 04/03/19 08:59


   Last Admin: 02/06/19 09:09 Dose:  100 mg


Sodium Chloride (Nacl 0.9%)  1,000 mls @ 50 mls/hr IV .Q20H [facility]


   Stop: 04/02/19 09:59


   Last Admin: 02/06/19 05:45 Dose:  50 mls/hr


Levetiracetam (Keppra)  1,000 mg PO BID [facility]


   Stop: 04/02/19 16:59


   Last Admin: 02/06/19 09:09 Dose:  1,000 mg


Lorazepam (Ativan)  0.5 mg PO Q4HR PRN; Protocol


   PRN Reason: Anxiety


   Stop: 04/02/19 13:56


Lorazepam (Ativan)  2 mg IVP Q4HR PRN; Protocol


   PRN Reason: Seizures


   Stop: 04/02/19 14:06


Magnesium Hydroxide (Milk Of Magnesia)  30 ml PO HS PRN


   PRN Reason: Constipation


   Stop: 04/02/19 13:56


Multivitamins/Vitamin C (Theragran)  1 tab PO DAILY [facility]


   Stop: 04/03/19 08:59


   Last Admin: 02/06/19 09:09 Dose:  1 tab


Quetiapine Fumarate (Seroquel)  25 mg PO BID [facility]; Protocol


   Stop: 04/02/19 16:59


   Last Admin: 02/06/19 09:09 Dose:  25 mg


Zolpidem Tartrate (Ambien)  5 mg PO HS PRN


   PRN Reason: Insomnia


   Stop: 04/02/19 13:56








General: no acute distress, cachectic


HEENT: atraumatic, normocephalic, PERRLA, EOMI


Neck: supple, no thyromegaly


Cardiovascular: S1S2, regular


Lungs: clear to auscultation bilaterally, clear to percussion


Abdomen: soft, no tender, no distended, no mass, no rebound


Extremities: no cyanosis, no clubbing, no edema


Neurological: awake, alert


Skin: intact





Infectious Disease Assmt/Plan





- Assessment


Assessment: 





1.  Most likely a breakthrough seizures. Keppra level undetectable. ? 

noncompliance.


2.  Hypertension.


3.  Psychosis, depression.


4.  Dementia.


5.  Agitation.





- Plan


Plan: 


CT head neg


CPM.


EEG needs to read by Dr Thurston.


DC plan.

## 2019-02-06 NOTE — GENERAL PROGRESS NOTE
Subjective





- Review of Systems


Service Date: 02/06/19


Subjective: 





Patient has no seizures less congestion lethargic





Objective





- Results


Result Diagrams: 


 02/03/19 05:56





 02/03/19 05:56


Recent Labs: 


 Laboratory Last Values











WBC  9.0 Th/cmm (4.8-10.8)   02/03/19  05:56    


 


RBC  3.95 Mil/cmm (4.30-5.70)  L  02/03/19  05:56    


 


Hgb  12.5 gm/dL (12-16)   02/03/19  05:56    


 


Hct  37.7 % (41.0-60)  L  02/03/19  05:56    


 


MCV  95.3 fl (80-99)   02/03/19  05:56    


 


MCH  31.7 pg (26.0-30.0)  H  02/03/19  05:56    


 


MCHC Differential  33.3 pg (28.0-36.0)   02/03/19  05:56    


 


RDW  12.8 % (11.5-20.0)   02/03/19  05:56    


 


Plt Count  168 Th/cmm (150-400)   02/03/19  05:56    


 


MPV  8.8 fl  02/03/19  05:56    


 


Add Manual Diff  YES   02/01/19  14:17    


 


Neutrophils %  79.8 % (40.0-80.0)   02/03/19  05:56    


 


Band Neutrophils %  0 % (0-10)   02/01/19  14:17    


 


Lymphocytes %  10.3 % (20.0-50.0)  L  02/03/19  05:56    


 


Monocytes %  9.2 % (2.0-10.0)   02/03/19  05:56    


 


Eosinophils %  0.5 % (0.0-5.0)   02/03/19  05:56    


 


Basophils %  0.2 % (0.0-2.0)   02/03/19  05:56    


 


Neutrophils (Manual)  90 % (40-80)  H  02/01/19  14:17    


 


Lymphocytes  4 % (20-50)  L  02/01/19  14:17    


 


Monocytes  5 % (2-10)   02/01/19  14:17    


 


Eosinophils  1 % (0-5)   02/01/19  14:17    


 


Basophils  0 % (0-3)   02/01/19  14:17    


 


Sodium  139 mEq/L (136-145)   02/03/19  05:56    


 


Potassium  3.6 mEq/L (3.5-5.1)   02/03/19  05:56    


 


Chloride  104 mEq/L ()   02/03/19  05:56    


 


Carbon Dioxide  28.1 mEq/L (21.0-31.0)   02/03/19  05:56    


 


Anion Gap  10.5  (7.0-16.0)   02/03/19  05:56    


 


BUN  11 mg/dL (7-25)   02/03/19  05:56    


 


Creatinine  0.6 mg/dL (0.7-1.3)  L  02/03/19  05:56    


 


Est GFR ( Amer)  > 60.0 ml/min (>90)   02/03/19  05:56    


 


Est GFR (Non-Af Amer)  > 60.0 ml/min  02/03/19  05:56    


 


BUN/Creatinine Ratio  18.3   02/03/19  05:56    


 


Glucose  116 mg/dL ()  H  02/03/19  05:56    


 


Calcium  8.7 mg/dL (8.6-10.3)   02/03/19  05:56    


 


Levetiracetam  None Detected ug/mL (10.0-40.0)   02/01/19  14:17    














- Physical Exam


Vitals and I&O: 


 Vital Signs











Temp  97.3 F   02/06/19 11:49


 


Pulse  72   02/06/19 11:49


 


Resp  18   02/06/19 11:49


 


BP  142/79   02/06/19 11:49


 


Pulse Ox  96   02/06/19 11:49








 Intake & Output











 02/05/19 02/06/19 02/06/19





 18:59 06:59 18:59


 


Intake Total 500 1015 


 


Balance 500 1015 


 


Weight (lbs) 62.596 kg 62.596 kg 


 


Intake:   


 


  Intake, IV Amount  1000 


 


    Sodium Chloride 0.9% 1,  1000 





    000 ml @ 50 mls/hr IV .   





    Q20H [facility] Rx#:[id number]   


 


  Oral 500 15 


 


Other:   


 


  # Voids 3 4 


 


  # Bowel Movements 1 1 


 


  Weight Source Bedscale Bedscale 











Active Medications: 


Current Medications





Acetaminophen (Tylenol)  650 mg PO Q4HR PRN


   PRN Reason: Mild Pain / Temp above 100


   Stop: 04/02/19 13:56


   Last Admin: 02/03/19 16:05 Dose:  650 mg


Al Hydrox/Mg Hydrox/Simethicone (Maalox)  30 ml PO Q4HR PRN


   PRN Reason: GI DISTRESS


   Stop: 04/02/19 13:56


Citalopram Hydrobromide (Celexa)  20 mg PO DAILY [facility]; Protocol


   Stop: 04/03/19 08:59


   Last Admin: 02/06/19 09:09 Dose:  20 mg


Docusate Sodium (Colace)  100 mg PO DAILY [facility]


   Stop: 04/03/19 08:59


   Last Admin: 02/06/19 09:09 Dose:  100 mg


Sodium Chloride (Nacl 0.9%)  1,000 mls @ 50 mls/hr IV .Q20H [facility]


   Stop: 04/02/19 09:59


   Last Admin: 02/06/19 05:45 Dose:  50 mls/hr


Levetiracetam (Keppra)  1,000 mg PO BID [facility]


   Stop: 04/02/19 16:59


   Last Admin: 02/06/19 09:09 Dose:  1,000 mg


Lorazepam (Ativan)  0.5 mg PO Q4HR PRN; Protocol


   PRN Reason: Anxiety


   Stop: 04/02/19 13:56


Lorazepam (Ativan)  2 mg IVP Q4HR PRN; Protocol


   PRN Reason: Seizures


   Stop: 04/02/19 14:06


Magnesium Hydroxide (Milk Of Magnesia)  30 ml PO HS PRN


   PRN Reason: Constipation


   Stop: 04/02/19 13:56


Multivitamins/Vitamin C (Theragran)  1 tab PO DAILY [facility]


   Stop: 04/03/19 08:59


   Last Admin: 02/06/19 09:09 Dose:  1 tab


Quetiapine Fumarate (Seroquel)  25 mg PO BID [facility]; Protocol


   Stop: 04/02/19 16:59


   Last Admin: 02/06/19 09:09 Dose:  25 mg


Zolpidem Tartrate (Ambien)  5 mg PO HS PRN


   PRN Reason: Insomnia


   Stop: 04/02/19 13:56








General: No acute distress


HEENT: Other (normal)


Neck: Supple, JVD, +2 carotid pulse wo bruit


Cardiovascular: Regular rate, Normal S1, Normal S2, Systolic murmurs


Lungs: Clear to auscultation, Normal air movement


Abdomen: Soft


Extremities: Pulses (normal)


Neurological: Other (grand mal seizures)





Assessment/Plan





- Assessment


Assessment: 





Grand mal seizures


Hyperlipidemia


GERD


Dementia


Urinary tract infection


Psychosis





- Plan


Plan: 





Continue patient on Tegretol will give Ativan IV as necessary

## 2019-02-07 NOTE — PROGRESS NOTES
DATE:     02/06/2019



PSYCHOLOGY PROGRESS NOTE



SUBJECTIVE:  The patient is seen in his room.  The patient is somnolent, but

arousable by touch.  Mood seems to be less irritable.  The patient was not

speaking much.  The staff indicates the patient has not had any major behavioral

problems.  The patient has been compliant so far with his treatment.



OBJECTIVE:  Mood is less irritable.  Affect is appropriate.  Thought process

shows to be more goal oriented.  The patient did not answer questions about

suicidal ideation, plan or intention.  The patient denied any hallucinations or

delusions.  The patient's behavior has been more goal oriented.



ASSESSMENT AND PLAN:  We will continue to provide supportive psychotherapy along

with remotivation for the patient to stay compliant with all aspects of his care

and treatment.  We will encourage the patient to verbalize his concerns versus

acting out.  We will provide de-escalation when needed and also encourage the

patient to demonstrate emotional and self-regulation.  We will provide reality

orientation, differentiation and integration.  We will provide coping strategies

to assist the patient in adjusting to his medical condition.  We will follow up

in 3-5 days to continue present treatment if needed and the patient is still

admitted.





DD: 02/06/2019 16:03

DT: 02/07/2019 06:44

JOB# [id number]  [id number]

ALBARO

## 2019-02-07 NOTE — GENERAL PROGRESS NOTE
Subjective





- Review of Systems


Service Date: 19


Subjective: 





Patient has no seizures less congestion lethargic





Objective





- Results


Result Diagrams: 


 19 05:56





 19 05:56


Recent Labs: 


 Laboratory Last Values











WBC  9.0 Th/cmm (4.8-10.8)   19  05:56    


 


RBC  3.95 Mil/cmm (4.30-5.70)  L  19  05:56    


 


Hgb  12.5 gm/dL (12-16)   19  05:56    


 


Hct  37.7 % (41.0-60)  L  19  05:56    


 


MCV  95.3 fl (80-99)   19  05:56    


 


MCH  31.7 pg (26.0-30.0)  H  19  05:56    


 


MCHC Differential  33.3 pg (28.0-36.0)   19  05:56    


 


RDW  12.8 % (11.5-20.0)   19  05:56    


 


Plt Count  168 Th/cmm (150-400)   19  05:56    


 


MPV  8.8 fl  19  05:56    


 


Add Manual Diff  YES   19  14:17    


 


Neutrophils %  79.8 % (40.0-80.0)   19  05:56    


 


Band Neutrophils %  0 % (0-10)   19  14:17    


 


Lymphocytes %  10.3 % (20.0-50.0)  L  19  05:56    


 


Monocytes %  9.2 % (2.0-10.0)   19  05:56    


 


Eosinophils %  0.5 % (0.0-5.0)   19  05:56    


 


Basophils %  0.2 % (0.0-2.0)   19  05:56    


 


Neutrophils (Manual)  90 % (40-80)  H  19  14:17    


 


Lymphocytes  4 % (20-50)  L  19  14:17    


 


Monocytes  5 % (2-10)   19  14:17    


 


Eosinophils  1 % (0-5)   19  14:17    


 


Basophils  0 % (0-3)   19  14:17    


 


Sodium  139 mEq/L (136-145)   19  05:56    


 


Potassium  3.6 mEq/L (3.5-5.1)   19  05:56    


 


Chloride  104 mEq/L ()   19  05:56    


 


Carbon Dioxide  28.1 mEq/L (21.0-31.0)   19  05:56    


 


Anion Gap  10.5  (7.0-16.0)   19  05:56    


 


BUN  11 mg/dL (7-25)   19  05:56    


 


Creatinine  0.6 mg/dL (0.7-1.3)  L  19  05:56    


 


Est GFR ( Amer)  > 60.0 ml/min (>90)   19  05:56    


 


Est GFR (Non-Af Amer)  > 60.0 ml/min  19  05:56    


 


BUN/Creatinine Ratio  18.3   19  05:56    


 


Glucose  116 mg/dL ()  H  19  05:56    


 


Calcium  8.7 mg/dL (8.6-10.3)   19  05:56    


 


Levetiracetam  None Detected ug/mL (10.0-40.0)   19  14:17    














- Physical Exam


Vitals and I&O: 


 Vital Signs











Temp  97.8 F   19 11:56


 


Pulse  63   19 11:56


 


Resp  18   19 11:56


 


BP  104/66   19 11:56


 


Pulse Ox  96   19 11:56








 Intake & Output











 19





 18:59 06:59 18:59


 


Other:   


 


  Stool Characteristics  Soft Soft





  Brown Brown











Active Medications: 


Current Medications





Acetaminophen (Tylenol)  650 mg PO Q4HR PRN


   PRN Reason: Mild Pain / Temp above 100


   Stop: 19 13:56


   Last Admin: 19 16:05 Dose:  650 mg


Al Hydrox/Mg Hydrox/Simethicone (Maalox)  30 ml PO Q4HR PRN


   PRN Reason: GI DISTRESS


   Stop: 19 13:56


Citalopram Hydrobromide (Celexa)  20 mg PO DAILY JESSICA; Protocol


   Stop: 19 08:59


   Last Admin: 19 09:03 Dose:  20 mg


Docusate Sodium (Colace)  100 mg PO DAILY [facility]


   Stop: 19 08:59


   Last Admin: 19 09:03 Dose:  100 mg


Sodium Chloride (Nacl 0.9%)  1,000 mls @ 50 mls/hr IV .Q20H JESSICA


   Stop: 19 09:59


   Last Admin: 19 05:45 Dose:  50 mls/hr


Levetiracetam (Keppra)  1,000 mg PO BID JESSICA


   Stop: 19 16:59


   Last Admin: 19 09:03 Dose:  1,000 mg


Lorazepam (Ativan)  0.5 mg PO Q4HR PRN; Protocol


   PRN Reason: Anxiety


   Stop: 19 13:56


Lorazepam (Ativan)  2 mg IVP Q4HR PRN; Protocol


   PRN Reason: Seizures


   Stop: 19 14:06


Magnesium Hydroxide (Milk Of Magnesia)  30 ml PO HS PRN


   PRN Reason: Constipation


   Stop: 19 13:56


Multivitamins/Vitamin C (Theragran)  1 tab PO DAILY JESSICA


   Stop: 19 08:59


   Last Admin: 19 09:03 Dose:  1 tab


Quetiapine Fumarate (Seroquel)  25 mg PO BID [facility]; Protocol


   Stop: 19 16:59


   Last Admin: 19 09:03 Dose:  25 mg


Zolpidem Tartrate (Ambien)  5 mg PO HS PRN


   PRN Reason: Insomnia


   Stop: 19 13:56








General: No acute distress


HEENT: Other (normal)


Neck: Supple, JVD, +2 carotid pulse wo bruit


Cardiovascular: Regular rate, Normal S1, Normal S2, Systolic murmurs


Lungs: Clear to auscultation, Normal air movement


Abdomen: Soft


Extremities: Pulses (normal)


Neurological: Other (grand mal seizures)





Assessment/Plan





- Assessment


Assessment: 





Grand mal seizures


Hyperlipidemia


GERD


Dementia


Urinary tract infection


Psychosis





- Plan


Plan: 





Continue patient on Tegretol will give Ativan IV as necessary


Discharge planning





Nutritional Asmnt/Malnutr-PDOC





- Dietary Evaluation


Malnutrition Findings (Please click <Entered> for more info): 








Nutritional Asmnt/Malnutrition                             Start:  19 10:

10


Text:                                                      Status: Active      

  


Freq:                                                                          

  


Protocol:                                                                      

  


 Document     19 10:10  HERIBERTO  (Rec: 19 10:16  HERIBERTO  [id number])


 Nutritional Asmnt/Malnutrition


     Patient General Information


      Nutritional Screening                      Moderate Risk


      Diagnosis                                  ALOC, seizure


      Pertinent Medical Hx/Surgical Hx           depression, anxiety, psychosis


                                                 , HTN, seizure, dementia


      Subjective Information                     Pt seen sitting up in bed at


                                                 time of visit, confused, not


                                                 aswering RD greeting. Per EMR,


                                                 PO intake 100% x 3 on .


      Current Diet Order/ Nutrition Support      pureed


      Pertinent Medications                      colace, theragran, seroquel,


                                                 nacl 0.9%


      Pertinent Labs                             2/3 Cr 0.6, glucose 116


     Nutritional Hx/Data


      Height                                     1.68 m


      Height (Calculated Centimeters)            167.6


      Current Weight (lbs)                       62.596 kg


      Weight (Calculated Kilograms)              62.6


      Weight (Calculated Grams)                  32769.7


      Ideal Body Weight                          142


      Body Mass Index (BMI)                      22.2


      Weight Status                              Approriate


     GI Symptoms


      GI Symptoms                                None


      Last BM                                    2/


      Difficult in:                              None


      Skin Integrity/Comment:                    bruise to left knee, left arm,


                                                 right knee


                                                 pressure area to back


                                                 burn to right forehead


                                                 anderson 14


      Current %PO                                Good (%)


     Estimated Nutritional Goals


      BEE in Kcals:                              Using Current wt


      Calories/Kcals/Kg                          25-30


      Kcals Calculated                           3941-5493


      Protein:                                   Using Current wt


      Protein g/k


      Protein Calculated                         63


      Fluid: ml                                  1575-1890ml (1ml/kcal)


     Nutritional Problem


      No current Nutrition Prob


       Problem                                   N/A


     Malnutrition Alert


      Is there a minimum of two criteria         No


       selected?                                 


       Query Text:Check all the applicable       


       criteria. A minimum of two criteria are   


       recommended for diagnosis of either       


       severe or non-severe malnutrition.        


     Malnutrition Related to Morbid Obesity


      Malnutrition related to morbid obesity     No


     Intervention/Recommendation


      Comments                                   1. Continue with pureed diet


                                                 as ordered. Nurses to assist


                                                 with meals.


                                                 2. Monitor PO intake, wt, labs


                                                 and skin integrity


                                                 3. F/U as low risk in 7 days,


                                                 PO check 


     Expected Outcomes/Goals


      Expected Outcomes/Goals                    1. PO intake to meet at least


                                                 75% of nutritional needs.


                                                 2. Wt stability, skin to


                                                 remain intact, labs to


                                                 approach WNL.

## 2019-10-16 NOTE — DIAGNOSTIC IMAGING REPORT
CHEST X-RAY: AP view



INDICATION: pain



COMPARISON: None



FINDINGS: Exam is limited due to positioning head obscuring the upper

hemithorax limiting assessment of the left apex.  There is no focal

consolidation or pleural effusions The heart is normal in size. 

Tortuous aorta is noted.  Old left lower rib fracture is noted.  No

pneumothorax



IMPRESSION:



No focal consolidation identified.



Tortuous aorta.
CT scan cervical spine



HISTORY: Pain, trauma



Total DLP equals 302



CTDI equals 15.9



Axial sections were obtained through the cervical spine.  Additional

sagittal and coronal reformatted images are provided.



Alignment is normal.  Narrowing the C5-6 interspace.  Small spur

formation noted about the endplates of C4, C5, and C6.  No acute

abnormalities.  No fractures.  The prevertebral soft tissues appear

normal.



IMPRESSION:

1.  No acute abnormalities

2.  Mild degenerative changes
CT scan of the brain without intravenous contrast



HISTORY: Headache, trauma



Total DLP equals 646



CTDI equals 34.4



Axial sections were obtained from the base of the skull to the vertex.



There is enlargement of the ventricular system along with enlargement of

cerebral sulci and subarachnoid cisterns reflecting generalized atrophy.

 No acute parenchymal abnormalities.  No intracerebral hemorrhage.  No

mass effect or shift of midline structures.  No extra-axial masses or

abnormal fluid collections.



IMPRESSION:

1.  No acute abnormalities

2.  Cerebral atrophy
Renal ultrasound



HISTORY: Abnormal renal function tests



Exam is extremely limited due to difficulty in patient cooperation.



There is incomplete delineation of the margins the right kidney which

measures approximately 9.0 x 4.0 x 5.0 cm.  Suggestion of a 7 mm

hypoechoic focus in the lower cortex.  No hydronephrosis.



The left kidney measures approximately 9.6 x 4.2 x 4.7 cm.  An

approximate 1.0 cm sonolucent focus noted in the medullary region. 

Findings suggest a probable cyst.



There is a partially contracted urinary bladder with questionable wall

thickening.



IMPRESSION:

1.  Very Limited/suboptimal exam

2.  Findings suggesting probable bilateral renal cysts

3.  No hydronephrosis
Public [employer]